# Patient Record
Sex: MALE | Race: WHITE | NOT HISPANIC OR LATINO | Employment: OTHER | URBAN - METROPOLITAN AREA
[De-identification: names, ages, dates, MRNs, and addresses within clinical notes are randomized per-mention and may not be internally consistent; named-entity substitution may affect disease eponyms.]

---

## 2017-01-10 ENCOUNTER — OFFICE VISIT (OUTPATIENT)
Dept: CARDIOLOGY | Facility: CLINIC | Age: 77
End: 2017-01-10

## 2017-01-10 VITALS
DIASTOLIC BLOOD PRESSURE: 65 MMHG | SYSTOLIC BLOOD PRESSURE: 114 MMHG | HEIGHT: 69 IN | BODY MASS INDEX: 29.38 KG/M2 | WEIGHT: 198.4 LBS | HEART RATE: 40 BPM

## 2017-01-10 DIAGNOSIS — I48.3 TYPICAL ATRIAL FLUTTER (HCC): ICD-10-CM

## 2017-01-10 DIAGNOSIS — I48.19 PERSISTENT ATRIAL FIBRILLATION (HCC): ICD-10-CM

## 2017-01-10 DIAGNOSIS — I44.1 SECOND DEGREE AV BLOCK, MOBITZ TYPE I: Primary | ICD-10-CM

## 2017-01-10 PROCEDURE — 99212 OFFICE O/P EST SF 10 MIN: CPT | Performed by: INTERNAL MEDICINE

## 2017-01-10 PROCEDURE — 93000 ELECTROCARDIOGRAM COMPLETE: CPT | Performed by: INTERNAL MEDICINE

## 2017-01-10 RX ORDER — DOFETILIDE 0.5 MG/1
500 CAPSULE ORAL 2 TIMES DAILY
Qty: 180 CAPSULE | Refills: 2 | Status: SHIPPED | OUTPATIENT
Start: 2017-01-10

## 2017-01-10 NOTE — LETTER
January 10, 2017     Sandoval Hunter MD  [address] [facility] [facility]  [address]  [facility] [address]    Patient: Ramos Anders   YOB: 1940   Date of Visit: 1/10/2017       Dear Dr. Dale MD:    Thank you for referring Ramos Anders to me for evaluation. Below are the relevant portions of my assessment and plan of care.    If you have questions, please do not hesitate to call me. I look forward to following Ramos along with you.         Sincerely,        Tenzin Tobar MD        CC: No Recipients  SHEILA José  1/10/2017 10:20 AM  Sign at close encounter  PCP:Dr. Sandoval Hunter      Chief Complaint: afib    History of Present Illness:    The acute uncomplicated chief complaint first occurred in 2012,  is persistent in nature, moderate in severity, random in occurrence, happening few times per every few months, lasting minutes at a time, and has been medicated with Tikosyn, and manifest as palpitations. It is not  worsened with exertion and is improved with rest.    Problem   Persistent Atrial Fibrillation    Persistent afib  A)Tikosyn 2012  B)Echo 2012 normal EF  C)Chadsvasc =2, Patient declined anticoagulation     Second Degree Av Block, Mobitz Type I    Asymptomatic-     Typical Atrial Flutter    S/p ablation 2007-            Current Outpatient Prescriptions:   •  acetaminophen (TYLENOL) 500 MG tablet, Take 2 tablets by mouth daily as needed., Disp: , Rfl:   •  aspirin 81 MG tablet, Take 1 tablet by mouth daily., Disp: , Rfl:   •  atorvastatin (LIPITOR) 20 MG tablet, Take 1 tablet by mouth daily., Disp: 90 tablet, Rfl: 1  •  levothyroxine (SYNTHROID) 125 MCG tablet, Take 1 tablet by mouth daily., Disp: 90 tablet, Rfl: 1  •  Multiple Vitamins-Minerals (MENS MULTI VITAMIN & MINERAL PO), Take 1 tablet by mouth daily., Disp: , Rfl:   •  tadalafil (CIALIS) 5 MG tablet, Take 1 tablet by mouth daily as needed. Take 1 or 2 tablets as needed, Disp: , Rfl:   •  TIKOSYN 500 MCG capsule,  "Take 1 capsule by mouth 2 (Two) Times a Day., Disp: 180 capsule, Rfl: 3   Allergies   Allergen Reactions   • Niacin Itching   • Sildenafil      flushing        ROS:    Denies chest pain, tightness, palpitations, IVY, PND, or edema    Visit Vitals   • /65 (BP Location: Left arm, Patient Position: Sitting)   • Pulse (!) 40   • Ht 69\" (175.3 cm)   • Wt 198 lb 6.4 oz (90 kg)   • BMI 29.3 kg/m2        Physical Exam:    Lungs: CTA, no wheezing, equal air entry bilaterally, resonant to percussion  Cor: RRR, physiologic S1, S2, no rubs, gallops, murmurs or thrills  Ext: warm, negative edema    ECG 12 Lead  Date/Time: 1/10/2017 10:04 AM  Performed by: INDERJIT NGUYEN  Authorized by: INDERJIT NGUYEN   Rhythm: A-V block  Conduction: 2nd degree (Mobitz 1)  ST Segments: ST segments normal  T Waves: T waves normal  Other: no other findings  Clinical impression: abnormal ECG             Diagnosis Plan   1. Second degree AV block, Mobitz type I  Ay symptomatic.  Tikosyn working well and he is tolerating it .  Chadsvasc=2 patient declines anticoagulation. Plan for follow up in 6 months or sooner as needed.   2. Persistent atrial fibrillation     3. Typical atrial flutter              Will Kike VIGIL scribe for Dr. Nguyen  "

## 2017-01-10 NOTE — MR AVS SNAPSHOT
Ramos Anders   1/10/2017 9:45 AM   Office Visit    Dept Phone:  [phone]   Encounter #:  [id number]    Provider:  Tenzin Tobar MD   Department:  [facility] MEDICAL [facility] CARDIOLOGY                Your Full Care Plan              Your Updated Medication List          This list is accurate as of: 1/10/17 10:30 AM.  Always use your most recent med list.                aspirin 81 MG tablet       atorvastatin 20 MG tablet   Commonly known as:  LIPITOR   Take 1 tablet by mouth daily.       levothyroxine 125 MCG tablet   Commonly known as:  SYNTHROID   Take 1 tablet by mouth daily.       MENS MULTI VITAMIN & MINERAL PO       tadalafil 5 MG tablet   Commonly known as:  CIALIS   Take 1 tablet by mouth daily as needed. Take 1 or 2 tablets as needed       TIKOSYN 500 MCG capsule   Generic drug:  dofetilide   Take 1 capsule by mouth 2 (Two) Times a Day.       TYLENOL 500 MG tablet   Generic drug:  acetaminophen               We Performed the Following     ECG 12 Lead       You Were Diagnosed With        Codes Comments    Second degree AV block, Mobitz type I    -  Primary ICD-10-CM: I44.1  ICD-9-CM: 426.13     Persistent atrial fibrillation     ICD-10-CM: I48.1  ICD-9-CM: 427.31     Typical atrial flutter     ICD-10-CM: I48.3  ICD-9-CM: 427.32       Instructions     None    Patient Instructions History      Upcoming Appointments     Visit Type Date Time Department    FOLLOW UP 1/10/2017  9:45 AM [facility]    FOLLOW UP 3/15/2017  8:30 AM [facility]    FOLLOW UP 7/25/2017 10:15 AM [facility]      [facility] Signup     Our records indicate that you have an active [employer] ??? account.    You can view your After Visit Summary by going to [employer] and logging in with your [email or web address] username and password.  If you don't have a [employer] username and password but a parent or guardian has access to your record, the parent or guardian should  "login with their own [employer] username and password and access your record to view the After Visit Summary.    If you have questions, you can email [email or web address] or call [phone] to talk to our [employer] staff.  Remember, [employer] is NOT to be used for urgent needs.  For medical emergencies, dial 911.               Other Info from Your Visit           Your Appointments     Mar 15, 2017  8:30 AM EDT   Follow Up with Sandoval Hunter MD   [facility] INTERNAL MEDICINE (--)    [address]  [facility] [address]   [phone]           Arrive 15 minutes prior to appointment.            Jul 25, 2017 10:15 AM EDT   Follow Up with Tenzin Tobar MD   [facility] CARDIOLOGY (--)    [address]  [facility] [address]   [phone]           Arrive 15 minutes prior to appointment.              Allergies     Niacin Intolerance Itching    Sildenafil Intolerance     flushing      Vital Signs     Blood Pressure Pulse Height Weight Body Mass Index Smoking Status    114/65 (BP Location: Left arm, Patient Position: Sitting) 40 69\" (175.3 cm) 198 lb 6.4 oz (90 kg) 29.3 kg/m2 Never Smoker      Problems and Diagnoses Noted     Atrial fibrillation (irregular heartbeat)    Heart problem    Typical atrial flutter      Results     ECG 12 Lead               "

## 2017-01-10 NOTE — PROGRESS NOTES
"PCP:Dr. Sandoval Hunter      Chief Complaint: afib    History of Present Illness:    The acute uncomplicated chief complaint first occurred in 2012,  is persistent in nature, moderate in severity, random in occurrence, happening few times per every few months, lasting minutes at a time, and has been medicated with Tikosyn, and manifest as palpitations. It is not  worsened with exertion and is improved with rest.    Problem   Persistent Atrial Fibrillation    Persistent afib  A)Tikosyn 2012  B)Echo 2012 normal EF  C)Chadsvasc =2, Patient declined anticoagulation     Second Degree Av Block, Mobitz Type I    Asymptomatic-     Typical Atrial Flutter    S/p ablation 2007-            Current Outpatient Prescriptions:   •  acetaminophen (TYLENOL) 500 MG tablet, Take 2 tablets by mouth daily as needed., Disp: , Rfl:   •  aspirin 81 MG tablet, Take 1 tablet by mouth daily., Disp: , Rfl:   •  atorvastatin (LIPITOR) 20 MG tablet, Take 1 tablet by mouth daily., Disp: 90 tablet, Rfl: 1  •  levothyroxine (SYNTHROID) 125 MCG tablet, Take 1 tablet by mouth daily., Disp: 90 tablet, Rfl: 1  •  Multiple Vitamins-Minerals (MENS MULTI VITAMIN & MINERAL PO), Take 1 tablet by mouth daily., Disp: , Rfl:   •  tadalafil (CIALIS) 5 MG tablet, Take 1 tablet by mouth daily as needed. Take 1 or 2 tablets as needed, Disp: , Rfl:   •  TIKOSYN 500 MCG capsule, Take 1 capsule by mouth 2 (Two) Times a Day., Disp: 180 capsule, Rfl: 3   Allergies   Allergen Reactions   • Niacin Itching   • Sildenafil      flushing        ROS:    Denies chest pain, tightness, palpitations, IVY, PND, or edema    Visit Vitals   • /65 (BP Location: Left arm, Patient Position: Sitting)   • Pulse (!) 40   • Ht 69\" (175.3 cm)   • Wt 198 lb 6.4 oz (90 kg)   • BMI 29.3 kg/m2        Physical Exam:    Lungs: CTA, no wheezing, equal air entry bilaterally, resonant to percussion  Cor: RRR, physiologic S1, S2, no rubs, gallops, murmurs or thrills  Ext: warm, negative " edema    ECG 12 Lead  Date/Time: 1/10/2017 10:04 AM  Performed by: INDERJIT NGUYEN  Authorized by: INDERJIT NGUYEN   Rhythm: A-V block  Conduction: 2nd degree (Mobitz 1)  ST Segments: ST segments normal  T Waves: T waves normal  Other: no other findings  Clinical impression: abnormal ECG             Diagnosis Plan   1. Second degree AV block, Mobitz type I  Ay symptomatic.  Tikosyn working well and he is tolerating it .  Chadsvasc=2 patient declines anticoagulation. Plan for follow up in 6 months or sooner as needed.   2. Persistent atrial fibrillation     3. Typical atrial flutter              Will Kike VIGIL scribe for Dr. Nguyen    I have interviewed/examined patient, reviewed note, exam, labs, have addended, if necessary, and agree.

## 2017-01-13 RX ORDER — ATORVASTATIN CALCIUM 20 MG/1
20 TABLET, FILM COATED ORAL DAILY
Qty: 90 TABLET | Refills: 1 | Status: SHIPPED | OUTPATIENT
Start: 2017-01-13 | End: 2017-07-19 | Stop reason: SDUPTHER

## 2017-01-13 RX ORDER — LEVOTHYROXINE SODIUM 0.12 MG/1
125 TABLET ORAL DAILY
Qty: 90 TABLET | Refills: 1 | Status: SHIPPED | OUTPATIENT
Start: 2017-01-13 | End: 2017-07-19 | Stop reason: SDUPTHER

## 2017-03-15 ENCOUNTER — APPOINTMENT (OUTPATIENT)
Dept: LAB | Facility: HOSPITAL | Age: 77
End: 2017-03-15

## 2017-03-15 ENCOUNTER — OFFICE VISIT (OUTPATIENT)
Dept: INTERNAL MEDICINE | Facility: CLINIC | Age: 77
End: 2017-03-15

## 2017-03-15 VITALS
TEMPERATURE: 96.4 F | RESPIRATION RATE: 16 BRPM | SYSTOLIC BLOOD PRESSURE: 130 MMHG | BODY MASS INDEX: 29.09 KG/M2 | WEIGHT: 197 LBS | DIASTOLIC BLOOD PRESSURE: 84 MMHG | OXYGEN SATURATION: 97 % | HEART RATE: 56 BPM

## 2017-03-15 DIAGNOSIS — E78.00 PURE HYPERCHOLESTEROLEMIA: ICD-10-CM

## 2017-03-15 DIAGNOSIS — E03.4 HYPOTHYROIDISM DUE TO ACQUIRED ATROPHY OF THYROID: ICD-10-CM

## 2017-03-15 DIAGNOSIS — Z96.643 STATUS POST BILATERAL HIP REPLACEMENTS: ICD-10-CM

## 2017-03-15 DIAGNOSIS — I48.0 PAROXYSMAL ATRIAL FIBRILLATION (HCC): Primary | ICD-10-CM

## 2017-03-15 DIAGNOSIS — M15.9 GENERALIZED OSTEOARTHRITIS: ICD-10-CM

## 2017-03-15 LAB
ALBUMIN SERPL-MCNC: 4.4 G/DL (ref 3.2–4.8)
ALBUMIN/GLOB SERPL: 1.8 G/DL (ref 1.5–2.5)
ALP SERPL-CCNC: 62 U/L (ref 25–100)
ALT SERPL W P-5'-P-CCNC: 26 U/L (ref 7–40)
ANION GAP SERPL CALCULATED.3IONS-SCNC: 1 MMOL/L (ref 3–11)
ARTICHOKE IGE QN: 85 MG/DL (ref 0–130)
AST SERPL-CCNC: 25 U/L (ref 0–33)
BILIRUB SERPL-MCNC: 0.8 MG/DL (ref 0.3–1.2)
BUN BLD-MCNC: 23 MG/DL (ref 9–23)
BUN/CREAT SERPL: 20.9 (ref 7–25)
CALCIUM SPEC-SCNC: 9.4 MG/DL (ref 8.7–10.4)
CHLORIDE SERPL-SCNC: 112 MMOL/L (ref 99–109)
CHOLEST SERPL-MCNC: 149 MG/DL (ref 0–200)
CO2 SERPL-SCNC: 30 MMOL/L (ref 20–31)
CREAT BLD-MCNC: 1.1 MG/DL (ref 0.6–1.3)
GFR SERPL CREATININE-BSD FRML MDRD: 65 ML/MIN/1.73
GLOBULIN UR ELPH-MCNC: 2.5 GM/DL
GLUCOSE BLD-MCNC: 95 MG/DL (ref 70–100)
HDLC SERPL-MCNC: 32 MG/DL (ref 40–60)
POTASSIUM BLD-SCNC: 4.9 MMOL/L (ref 3.5–5.5)
PROT SERPL-MCNC: 6.9 G/DL (ref 5.7–8.2)
SODIUM BLD-SCNC: 143 MMOL/L (ref 132–146)
TRIGL SERPL-MCNC: 155 MG/DL (ref 0–150)

## 2017-03-15 PROCEDURE — 80061 LIPID PANEL: CPT | Performed by: INTERNAL MEDICINE

## 2017-03-15 PROCEDURE — 36415 COLL VENOUS BLD VENIPUNCTURE: CPT | Performed by: INTERNAL MEDICINE

## 2017-03-15 PROCEDURE — 80053 COMPREHEN METABOLIC PANEL: CPT | Performed by: INTERNAL MEDICINE

## 2017-03-15 PROCEDURE — 99214 OFFICE O/P EST MOD 30 MIN: CPT | Performed by: INTERNAL MEDICINE

## 2017-03-15 NOTE — PROGRESS NOTES
Subjective   Ramos Anders is a 77 y.o. male.     History of Present Illness     The patient has many years of moderate hyperlipidemia.  He has changed from simvastatin to atorvastatin the last year and tolerates it well.  He follows an [employer] diet.  He has never been known to have atherosclerotic  disease.  His brother did die of coronary disease at age 66.      The following portions of the patient's history were reviewed and updated as appropriate: allergies, current medications, past family history, past medical history, past social history, past surgical history and problem list.    Review of Systems   Constitutional: Negative for appetite change and fatigue.   HENT: Negative for ear pain and sore throat.    Respiratory: Negative for cough and shortness of breath.    Cardiovascular: Negative for chest pain and palpitations.   Gastrointestinal: Negative for abdominal pain and nausea.   Genitourinary: Positive for frequency and urgency.        Urgency and frequency are manageable   Musculoskeletal: Positive for arthralgias. Negative for back pain and gait problem.        Generalized arthralgias are manageable.  Takes ibuprofen to 800 mg on days playing tennis.   Neurological: Negative for dizziness and headaches.       Objective   Blood pressure 130/84, pulse 56, temperature 96.4 °F (35.8 °C), temperature source Oral, resp. rate 16, weight 197 lb (89.4 kg), SpO2 97 %.    Physical Exam   Constitutional: He is oriented to person, place, and time.   Cardiovascular: Normal rate, regular rhythm and normal heart sounds.    Pulmonary/Chest: Effort normal and breath sounds normal. He has no wheezes. He has no rales.   Abdominal: Soft. Bowel sounds are normal. He exhibits no distension. There is no tenderness.   Musculoskeletal: Normal range of motion. He exhibits no edema or tenderness.   Moderate generalized osteoarthritis with minimal tenderness and mild stiffness   Neurological: He is alert  and oriented to person, place, and time. He displays normal reflexes. Coordination normal.   Psychiatric: He has a normal mood and affect. His behavior is normal. Judgment and thought content normal.   Nursing note and vitals reviewed.    Procedures  Assessment/Plan   Ramos was seen today for hyperlipidemia.    Diagnoses and all orders for this visit:    Paroxysmal atrial fibrillation    Pure hypercholesterolemia  -     Comprehensive Metabolic Panel  -     Lipid Panel    Hypothyroidism due to acquired atrophy of thyroid    Generalized osteoarthritis    Status post bilateral hip replacements    The patient's hyperlipidemia is well managed on Lipitor 20 mg with an LDL of 85.  I've asked him to stay on aspirin for primary prevention.  I've asked him to stay on a diabetic diet because of his prediabetes.    The patient has a defibrillation and is monitored by the cardiologist.  He has been advised to start anticoagulation for a chads score 2.  He is not ready to start on anticoagulation because of his vigorous exercise program.  I've counseled him that he may reevaluated later next year.    The patient has moderate generalized osteoarthritis but is done remarkably well with stretching and physical activities.  He uses Tylenol for pain with some success.  He uses ibuprofen once or twice a week while playing tennis because he is much more athletic.    Patient Instructions   1.  Continue same medications and supplements - as listed.    2.  Review yearly - risks and benefits of anticoagulation.    3.  Maintain routine physical fitness program - every week.    4.  Follow a low-calorie diabetic diet - low in salt and low in sugar.    5.  Return in July - annual checkup fasting.    6.  Laboratory tests are acceptable and require no change in treatment    Electronically signed Sandoval Hunter M.D.3/17/2017 5:01 PM

## 2017-03-15 NOTE — PATIENT INSTRUCTIONS
1.  Continue same medications and supplements - as listed.    2.  Review yearly - risks and benefits of anticoagulation.    3.  Maintain routine physical fitness program - every week.    4.  Follow a low-calorie diabetic diet - low in salt and low in sugar.    5.  Return in July - annual checkup fasting.

## 2017-03-23 ENCOUNTER — TELEPHONE (OUTPATIENT)
Dept: INTERNAL MEDICINE | Facility: CLINIC | Age: 77
End: 2017-03-23

## 2017-07-13 ENCOUNTER — LAB (OUTPATIENT)
Dept: LAB | Facility: HOSPITAL | Age: 77
End: 2017-07-13

## 2017-07-13 ENCOUNTER — OFFICE VISIT (OUTPATIENT)
Dept: INTERNAL MEDICINE | Facility: CLINIC | Age: 77
End: 2017-07-13

## 2017-07-13 VITALS
OXYGEN SATURATION: 97 % | TEMPERATURE: 97.7 F | WEIGHT: 190 LBS | HEART RATE: 48 BPM | HEIGHT: 69 IN | BODY MASS INDEX: 28.14 KG/M2 | SYSTOLIC BLOOD PRESSURE: 110 MMHG | RESPIRATION RATE: 16 BRPM | DIASTOLIC BLOOD PRESSURE: 70 MMHG

## 2017-07-13 DIAGNOSIS — N32.81 OVERACTIVE BLADDER: ICD-10-CM

## 2017-07-13 DIAGNOSIS — I48.0 PAROXYSMAL ATRIAL FIBRILLATION (HCC): ICD-10-CM

## 2017-07-13 DIAGNOSIS — R73.02 IMPAIRED GLUCOSE TOLERANCE: ICD-10-CM

## 2017-07-13 DIAGNOSIS — E78.00 PURE HYPERCHOLESTEROLEMIA: ICD-10-CM

## 2017-07-13 DIAGNOSIS — R35.1 NOCTURIA: ICD-10-CM

## 2017-07-13 DIAGNOSIS — I44.1 SECOND DEGREE AV BLOCK, MOBITZ TYPE I: ICD-10-CM

## 2017-07-13 DIAGNOSIS — Z77.018 EXPOSURE TO CHROMIUM: ICD-10-CM

## 2017-07-13 DIAGNOSIS — N52.9 ERECTILE DYSFUNCTION, UNSPECIFIED ERECTILE DYSFUNCTION TYPE: ICD-10-CM

## 2017-07-13 DIAGNOSIS — M15.9 GENERALIZED OSTEOARTHRITIS: ICD-10-CM

## 2017-07-13 DIAGNOSIS — R97.20 ELEVATED PROSTATE SPECIFIC ANTIGEN (PSA): Primary | ICD-10-CM

## 2017-07-13 DIAGNOSIS — D69.6 THROMBOCYTOPENIA (HCC): ICD-10-CM

## 2017-07-13 DIAGNOSIS — E03.4 HYPOTHYROIDISM DUE TO ACQUIRED ATROPHY OF THYROID: ICD-10-CM

## 2017-07-13 LAB
ALBUMIN SERPL-MCNC: 4.6 G/DL (ref 3.2–4.8)
ALBUMIN/GLOB SERPL: 1.9 G/DL (ref 1.5–2.5)
ALP SERPL-CCNC: 72 U/L (ref 25–100)
ALT SERPL W P-5'-P-CCNC: 26 U/L (ref 7–40)
ANION GAP SERPL CALCULATED.3IONS-SCNC: 8 MMOL/L (ref 3–11)
APTT PPP: 27.5 SECONDS (ref 24–31)
ARTICHOKE IGE QN: 85 MG/DL (ref 0–130)
AST SERPL-CCNC: 23 U/L (ref 0–33)
BACTERIA UR QL AUTO: ABNORMAL /HPF
BASOPHILS # BLD AUTO: 0.01 10*3/MM3 (ref 0–0.2)
BASOPHILS NFR BLD AUTO: 0.3 % (ref 0–1)
BILIRUB SERPL-MCNC: 0.8 MG/DL (ref 0.3–1.2)
BILIRUB UR QL STRIP: NEGATIVE
BUN BLD-MCNC: 22 MG/DL (ref 9–23)
BUN/CREAT SERPL: 20 (ref 7–25)
CALCIUM SPEC-SCNC: 9.8 MG/DL (ref 8.7–10.4)
CHLORIDE SERPL-SCNC: 105 MMOL/L (ref 99–109)
CHOLEST SERPL-MCNC: 139 MG/DL (ref 0–200)
CLARITY UR: CLEAR
CO2 SERPL-SCNC: 27 MMOL/L (ref 20–31)
COLOR UR: YELLOW
CREAT BLD-MCNC: 1.1 MG/DL (ref 0.6–1.3)
CRP SERPL-MCNC: 0.17 MG/DL (ref 0–1)
DEPRECATED RDW RBC AUTO: 47.6 FL (ref 37–54)
EOSINOPHIL # BLD AUTO: 0.13 10*3/MM3 (ref 0–0.3)
EOSINOPHIL NFR BLD AUTO: 3.5 % (ref 0–3)
ERYTHROCYTE [DISTWIDTH] IN BLOOD BY AUTOMATED COUNT: 13.9 % (ref 11.3–14.5)
GFR SERPL CREATININE-BSD FRML MDRD: 65 ML/MIN/1.73
GLOBULIN UR ELPH-MCNC: 2.4 GM/DL
GLUCOSE BLD-MCNC: 104 MG/DL (ref 70–100)
GLUCOSE UR STRIP-MCNC: NEGATIVE MG/DL
HBA1C MFR BLD: 5.6 % (ref 4.8–5.6)
HCT VFR BLD AUTO: 41.6 % (ref 38.9–50.9)
HDLC SERPL-MCNC: 32 MG/DL (ref 40–60)
HGB BLD-MCNC: 13.6 G/DL (ref 13.1–17.5)
HGB UR QL STRIP.AUTO: NEGATIVE
HYALINE CASTS UR QL AUTO: ABNORMAL /LPF
IMM GRANULOCYTES # BLD: 0.01 10*3/MM3 (ref 0–0.03)
IMM GRANULOCYTES NFR BLD: 0.3 % (ref 0–0.6)
INR PPP: 1.09
KETONES UR QL STRIP: ABNORMAL
LEUKOCYTE ESTERASE UR QL STRIP.AUTO: ABNORMAL
LYMPHOCYTES # BLD AUTO: 1.21 10*3/MM3 (ref 0.6–4.8)
LYMPHOCYTES NFR BLD AUTO: 32.7 % (ref 24–44)
MCH RBC QN AUTO: 30.8 PG (ref 27–31)
MCHC RBC AUTO-ENTMCNC: 32.7 G/DL (ref 32–36)
MCV RBC AUTO: 94.1 FL (ref 80–99)
MONOCYTES # BLD AUTO: 0.27 10*3/MM3 (ref 0–1)
MONOCYTES NFR BLD AUTO: 7.3 % (ref 0–12)
NEUTROPHILS # BLD AUTO: 2.07 10*3/MM3 (ref 1.5–8.3)
NEUTROPHILS NFR BLD AUTO: 55.9 % (ref 41–71)
NITRITE UR QL STRIP: NEGATIVE
PH UR STRIP.AUTO: 5.5 [PH] (ref 5–8)
PLATELET # BLD AUTO: 101 10*3/MM3 (ref 150–450)
PMV BLD AUTO: 10.2 FL (ref 6–12)
POTASSIUM BLD-SCNC: 4.4 MMOL/L (ref 3.5–5.5)
PROT SERPL-MCNC: 7 G/DL (ref 5.7–8.2)
PROT UR QL STRIP: NEGATIVE
PROTHROMBIN TIME: 11.9 SECONDS (ref 9.6–11.5)
PSA SERPL-MCNC: 5.93 NG/ML (ref 0–4)
RBC # BLD AUTO: 4.42 10*6/MM3 (ref 4.2–5.76)
RBC # UR: ABNORMAL /HPF
REF LAB TEST METHOD: ABNORMAL
SODIUM BLD-SCNC: 140 MMOL/L (ref 132–146)
SP GR UR STRIP: >=1.03 (ref 1–1.03)
SQUAMOUS #/AREA URNS HPF: ABNORMAL /HPF
T4 FREE SERPL-MCNC: 1.3 NG/DL (ref 0.89–1.76)
TRIGL SERPL-MCNC: 126 MG/DL (ref 0–150)
TSH SERPL DL<=0.05 MIU/L-ACNC: 1.71 MIU/ML (ref 0.35–5.35)
UROBILINOGEN UR QL STRIP: ABNORMAL
WBC NRBC COR # BLD: 3.7 10*3/MM3 (ref 3.5–10.8)
WBC UR QL AUTO: ABNORMAL /HPF

## 2017-07-13 PROCEDURE — 83018 HEAVY METAL QUAN EACH NES: CPT | Performed by: INTERNAL MEDICINE

## 2017-07-13 PROCEDURE — 84443 ASSAY THYROID STIM HORMONE: CPT | Performed by: INTERNAL MEDICINE

## 2017-07-13 PROCEDURE — 99215 OFFICE O/P EST HI 40 MIN: CPT | Performed by: INTERNAL MEDICINE

## 2017-07-13 PROCEDURE — 82495 ASSAY OF CHROMIUM: CPT | Performed by: INTERNAL MEDICINE

## 2017-07-13 PROCEDURE — 85730 THROMBOPLASTIN TIME PARTIAL: CPT | Performed by: INTERNAL MEDICINE

## 2017-07-13 PROCEDURE — 80061 LIPID PANEL: CPT | Performed by: INTERNAL MEDICINE

## 2017-07-13 PROCEDURE — 84153 ASSAY OF PSA TOTAL: CPT | Performed by: INTERNAL MEDICINE

## 2017-07-13 PROCEDURE — 84481 FREE ASSAY (FT-3): CPT | Performed by: INTERNAL MEDICINE

## 2017-07-13 PROCEDURE — 93000 ELECTROCARDIOGRAM COMPLETE: CPT | Performed by: INTERNAL MEDICINE

## 2017-07-13 PROCEDURE — 84439 ASSAY OF FREE THYROXINE: CPT | Performed by: INTERNAL MEDICINE

## 2017-07-13 PROCEDURE — 81001 URINALYSIS AUTO W/SCOPE: CPT | Performed by: INTERNAL MEDICINE

## 2017-07-13 PROCEDURE — 85025 COMPLETE CBC W/AUTO DIFF WBC: CPT | Performed by: INTERNAL MEDICINE

## 2017-07-13 PROCEDURE — 80053 COMPREHEN METABOLIC PANEL: CPT | Performed by: INTERNAL MEDICINE

## 2017-07-13 PROCEDURE — 36415 COLL VENOUS BLD VENIPUNCTURE: CPT | Performed by: INTERNAL MEDICINE

## 2017-07-13 PROCEDURE — 85610 PROTHROMBIN TIME: CPT | Performed by: INTERNAL MEDICINE

## 2017-07-13 PROCEDURE — 86140 C-REACTIVE PROTEIN: CPT | Performed by: INTERNAL MEDICINE

## 2017-07-13 PROCEDURE — 83036 HEMOGLOBIN GLYCOSYLATED A1C: CPT | Performed by: INTERNAL MEDICINE

## 2017-07-13 NOTE — PROGRESS NOTES
Subjective   Ramos Anders is a 77 y.o. male.     Chief Complaint   Patient presents with   • Hyperlipidemia       History of Present Illness     The patient has a ten-year history of cardiac arrhythmia.  He presented with atrial flutter in 2007 and underwent ablation therapy.  2013 he had recurrent atrial fibrillation and was again cardioverted.  Over the last 2 years he has entered a chronic atrial fibrillation.  He has been advised to start anticoagulation but feels that he can avoid the embolic risk because of his regular physical exercise.  He does take an aspirin tablet daily.  He still maintains a highly functional lifestyle and plays competitive tennis.  He has had no lightheadedness shortness of breath or exertional chest pains.  He has a family history of coronary disease and continues on Lipitor therapy.  His last LDL cholesterol was 85.  He is prediabetic with mild fasting hyperglycemia.  His hemoglobin A1c's have remained in the normal range.    The following portions of the patient's history were reviewed and updated as appropriate: allergies, current medications, past family history, past medical history, past social history, past surgical history and problem list.    Active Ambulatory Problems     Diagnosis Date Noted   • Second degree AV block, Mobitz type I 06/28/2016   • Typical atrial flutter 06/28/2016   • Ankylosis of joint of thigh 06/30/2016   • Generalized osteoarthritis 06/30/2016   • Hypothyroidism 06/30/2016   • Hyperlipidemia 06/30/2016   • Arthralgia of hip 06/30/2016   • Overactive bladder 06/30/2016   • Impaired glucose tolerance 06/30/2016   • Elevated prostate specific antigen (PSA) 06/30/2016   • Thrombocytopenia 07/07/2016   • Preventative health care 11/09/2016   • Atrial fibrillation    • Diverticulosis    • ED (erectile dysfunction)    • Exposure to chromium 07/13/2017     Resolved Ambulatory Problems     Diagnosis Date Noted   • Persistent atrial fibrillation 06/28/2016    • Congenital hypothyroidism without goiter 2016   • Dyslipidemia 2016   • Osteoarthritis of multiple joints 2016   • Vasculogenic erectile dysfunction 2016   • Diverticulitis of intestine without perforation or abscess 2016   • Status post total right knee replacement 2016   • Status post bilateral hip replacements 2016   • Prediabetes    • Atrial flutter      Past Medical History:   Diagnosis Date   • Abnormal blood level of cobalt    • Actinic keratosis    • Ankylosis    • Atrial flutter    • Chronic atrial fibrillation    • Diverticulosis    • Dupuytren's contracture    • ED (erectile dysfunction)    • Elevated PSA    • Generalized osteoarthritis    • Hazardous metal contact confirmed    • Hemorrhoids    • Hyperlipidemia    • Hypothyroidism    • Osteoporosis    • Overactive bladder    • Pancytopenia    • Prediabetes      Past Surgical History:   Procedure Laterality Date   • CARDIAC ABLATION      Atrial fib   • CARDIOVERSION       atrial fibrillation   • CATARACT EXTRACTION WITH INTRAOCULAR LENS IMPLANT Bilateral 2012    left then right   • TOTAL HIP ARTHROPLASTY Right 2009     and 2015 on left   • TOTAL KNEE ARTHROPLASTY Bilateral      again on left      Family History   Problem Relation Age of Onset   • Alzheimer's disease Mother       age 90   • Cancer Mother      Vaginal   • Hearing loss Mother    • Heart disease Mother      Cardiac surgery   • Pneumonia Father       at age 88   • Kidney failure Father    • Coronary artery disease Brother       age 66   • Diabetes Brother    • Kidney failure Brother    • Heart failure Brother      congestive   • Diabetes Paternal Grandmother    • Heart disease Maternal Uncle    • Osteoarthritis Other      Social History     Social History   • Marital status:      Spouse name: N/A   • Number of children: N/A   • Years of education: N/A     Occupational History    • Not on file.     Social History Main Topics   • Smoking status: Never Smoker   • Smokeless tobacco: Never Used   • Alcohol use 3.0 oz/week     5 Cans of beer per week      Comment: occas.   • Drug use: No   • Sexual activity: Defer     Other Topics Concern   • Not on file     Social History Narrative    Domestic life- lives in private home with wife        Oriental orthodox- Evangelical        Sleep hygiene- in bed 11 PM to 7 AM for  8 hours nightly        Caffeine use- 2 cups coffee daily        Exercise habits- Plays tennis and golf as tolerated, walking daily, occasional stationary cycling        Diet- low calorie diabetic diet - low in salt low in sugar         Occupation- Retired, Computer         Hearing :  No impairment        Vision :  Bifocals        Driving :  No limitations              Review of Systems   Constitutional: Negative for appetite change and fatigue.   HENT: Negative for ear pain and sore throat.    Eyes: Negative for itching and visual disturbance.   Respiratory: Negative for cough and shortness of breath.    Cardiovascular: Negative for chest pain and palpitations.   Gastrointestinal: Negative for abdominal pain and nausea.   Endocrine: Negative for cold intolerance and heat intolerance.   Genitourinary: Negative for dysuria and hematuria.   Musculoskeletal: Positive for arthralgias. Negative for back pain and gait problem.        Joint aches respond to Tylenol daily.  Occasionally takes ibuprofen prior to tennis matches.   Skin: Negative for rash and wound.   Allergic/Immunologic: Negative for environmental allergies and food allergies.   Neurological: Negative for dizziness and headaches.   Hematological: Negative for adenopathy. Does not bruise/bleed easily.   Psychiatric/Behavioral: Negative for sleep disturbance. The patient is not nervous/anxious.        Objective   Blood pressure 110/70, pulse (!) 48, temperature 97.7 °F (36.5 °C), temperature source Oral, resp. rate 16,  "height 69\" (175.3 cm), weight 190 lb (86.2 kg), SpO2 97 %.    Physical Exam   Constitutional: He is oriented to person, place, and time. He appears well-developed and well-nourished. No distress.   HENT:   Right Ear: External ear normal.   Left Ear: External ear normal.   Nose: Nose normal.   Mouth/Throat: Oropharynx is clear and moist.   Eyes: EOM are normal. Pupils are equal, round, and reactive to light. No scleral icterus.   Neck: Normal range of motion. Neck supple. No JVD present. No thyromegaly present.   Cardiovascular: Normal rate, regular rhythm, normal heart sounds and intact distal pulses.    Pulmonary/Chest: Effort normal and breath sounds normal. He has no wheezes. He has no rales.   Abdominal: Soft. Bowel sounds are normal. He exhibits no distension and no mass. There is no tenderness. No hernia.   Genitourinary: Rectum normal and penis normal.   Genitourinary Comments: Testicles normal.  Prostate diffusely enlarged.   Musculoskeletal: He exhibits no edema or tenderness.   Left knee 90° range of motion with moderate stiffness.  Moderate osteoarthritis of hips and knees.   Lymphadenopathy:     He has no cervical adenopathy.   Neurological: He is alert and oriented to person, place, and time. He displays normal reflexes. No cranial nerve deficit. He exhibits normal muscle tone. Coordination normal.   Vibratory normal  Romberg negative  Gait normal  Plantars downgoing     Skin: Skin is warm and dry. No rash noted.   chronic sun damage of face and hands   Psychiatric: He has a normal mood and affect. His behavior is normal. Judgment and thought content normal.   Nursing note and vitals reviewed.      ECG 12 Lead  Date/Time: 7/13/2017 9:26 AM  Performed by: SANDOVAL HUNTER  Authorized by: SANDOVAL HUNTER   Interpreted by ED physician: Sandoval Hunter M.D.  Comparison: compared with previous ECG from 1/10/2017  Similar to previous ECG  Rhythm: atrial fibrillation  Rate: bradycardic  BPM: " 45  Conduction: conduction normal  ST Segments: ST segments normal  T Waves: T waves normal  QRS axis: normal  Clinical impression: abnormal ECG  Comments: Indication atrial fibrillation  Baseline EKG  Possible severe second degree AV block.            Assessment/Plan   Ramos was seen today for hyperlipidemia.    Diagnoses and all orders for this visit:    Elevated prostate specific antigen (PSA)    Exposure to chromium  -     Chromium Level; Future  -     Cobalt; Future    Paroxysmal atrial fibrillation  -     ECG 12 Lead  -     Holter Monitor - 24 Hour  -     Protime-INR  -     APTT; Future    Second degree AV block, Mobitz type I  -     Holter Monitor - 24 Hour    Hypothyroidism due to acquired atrophy of thyroid  -     TSH  -     T4, Free  -     T3, Free    Impaired glucose tolerance  -     Hemoglobin A1c    Overactive bladder  -     Urinalysis With / Microscopic If Indicated  -     Urinalysis, Microscopic Only; Future  -     Urinalysis, Microscopic Only    Generalized osteoarthritis  -     C-reactive Protein  -     PSA    Erectile dysfunction, unspecified erectile dysfunction type    Thrombocytopenia  -     CBC & Differential  -     CBC Auto Differential    Pure hypercholesterolemia  -     Comprehensive Metabolic Panel  -     Lipid Panel    Nocturia   -     PSA      Patient's cardiac arrhythmia has reached a more severe state.  He now has frequent sinus pauses of 2.1 seconds.  His general heart rate is adequate and he has had no significant symptoms.  He should be monitored on a yearly basis.  I've again counseled him on the use of warfarin and other anticoagulants.    His hyperlipidemia is in adequate control with an LDL cholesterol of 85.  I've asked him to continue on aspirin for primary prevention.    The patient's prediabetes is also adequate control with a hemoglobin A1c of 5.6.  This is entered the highest normal range now and he should continue a diabetic diet for goal weight loss of 5 pounds over the  next year.    The patient's had multiple artificial joint replacements.  He runs a high level of cobalt and chromium.  Blood levels this visit are generally stable and require no active treatment.    The patient has an 11 year history of primary hypothyroidism.  He is well compensated with normal thyroid function tests today.    The patient has advanced osteoarthritis with particular changes in his hips and knees requiring joint replacement.  His left knee remains particularly stiff.  I've advised him to use his stationary cycle 3 days weekly to build knee support strength.    Patient Instructions   1.  Wear Holter monitor for 24 hours - return to the office.    2.  Continue usual medicines and supplements - as listed.    3.  Low-calorie diabetic diet - low in salt and low in sugar.  Goal weight - always below 190 pounds.    4.  Stationary cycle 15 minutes - 3 days weekly - build knee strength.    5.  Maintain a routine exercise program - every week.    6.  Consider anticoagulation - for long-term safety.    7.  Return visit November - fasting checkup and wellness exam with repeat PSA.    8.  Confirm Tdap vaccine and Prevnar vaccine next visit.    9.  Blood sugar elevated 104.  Hemoglobin A1c high normal at 5.6%.  Continue diabetic diet.    10.  Thyroid tests are excellent with a TSH of 1.7.    11.  Chromium and cobalt levels are stable.    12.  PSA mildly elevated 5.9%.  Recommend continued monitoring.  Consider antibiotic and repeat.    13.  Urinalysis shows a few extra by blood cells.  Monitor urinary symptoms.     14.  Other laboratory tests are acceptable and require no change in treatment.    15.  Holter monitor shows frequent 2 second pauses.  Generally heart rate is adequate.     Current Outpatient Prescriptions:   •  acetaminophen (TYLENOL) 500 MG tablet, Take 2 tablets by mouth daily as needed., Disp: , Rfl:   •  aspirin 81 MG tablet, Take 1 tablet by mouth daily., Disp: , Rfl:   •  atorvastatin (LIPITOR)  20 MG tablet, Take 1 tablet by mouth Daily., Disp: 90 tablet, Rfl: 1  •  levothyroxine (SYNTHROID) 125 MCG tablet, Take 1 tablet by mouth Daily., Disp: 90 tablet, Rfl: 1  •  Multiple Vitamins-Minerals (MENS MULTI VITAMIN & MINERAL PO), Take 1 tablet by mouth daily., Disp: , Rfl:   •  tadalafil (CIALIS) 5 MG tablet, Take 1 tablet by mouth daily as needed. Take 1 or 2 tablets as needed, Disp: , Rfl:   •  TIKOSYN 500 MCG capsule, Take 1 capsule by mouth 2 (Two) Times a Day., Disp: 180 capsule, Rfl: 2    Allergies   Allergen Reactions   • Niacin Itching   • Sildenafil      flushing       Immunization History   Administered Date(s) Administered   • Influenza, Quadrivalent 01/01/2016   • Pneumococcal Polysaccharide 06/29/2007   • Td 06/02/2006   • Zoster 04/01/2012   • influenza Split 11/09/2016     Electronically signed Sandoval Hunter M.D.7/15/2017 11:26 AM

## 2017-07-13 NOTE — PATIENT INSTRUCTIONS
1.  Wear Holter monitor for 24 hours - return to the office.    2.  Continue usual medicines and supplements - as listed.    3.  Low-calorie diabetic diet - low in salt and low in sugar.  Goal weight - always below 190 pounds.    4.  Stationary cycle 15 minutes - 3 days weekly - build knee strength.    5.  Maintain a routine exercise program - every week.    6.  Consider anticoagulation - for long-term safety.    7.  Return visit November - fasting checkup and wellness exam with repeat PSA.

## 2017-07-14 LAB
COBALT SERPL-MCNC: 14.9 UG/L (ref 0–0.9)
CR SERPL-MCNC: 7.8 UG/L (ref 0.1–2.1)
T3FREE SERPL-MCNC: 2.8 PG/ML (ref 2–4.4)

## 2017-07-15 PROCEDURE — 93224 XTRNL ECG REC UP TO 48 HRS: CPT | Performed by: INTERNAL MEDICINE

## 2017-07-19 ENCOUNTER — TELEPHONE (OUTPATIENT)
Dept: INTERNAL MEDICINE | Facility: CLINIC | Age: 77
End: 2017-07-19

## 2017-07-19 RX ORDER — ATORVASTATIN CALCIUM 20 MG/1
20 TABLET, FILM COATED ORAL DAILY
Qty: 90 TABLET | Refills: 1 | Status: SHIPPED | OUTPATIENT
Start: 2017-07-19 | End: 2018-01-08

## 2017-07-19 RX ORDER — LEVOTHYROXINE SODIUM 0.12 MG/1
125 TABLET ORAL DAILY
Qty: 90 TABLET | Refills: 1 | Status: SHIPPED | OUTPATIENT
Start: 2017-07-19 | End: 2018-01-08

## 2017-09-26 ENCOUNTER — TELEPHONE (OUTPATIENT)
Dept: INTERNAL MEDICINE | Facility: CLINIC | Age: 77
End: 2017-09-26

## 2017-09-26 NOTE — TELEPHONE ENCOUNTER
----- Message from Amara Graham sent at 9/26/2017 11:08 AM EDT -----  Contact: patient  REFERRAL NEEDED: please put a referral in EPIC to a dermatologist for a general skin check.  Prefers not to go to [facility].

## 2017-09-26 NOTE — TELEPHONE ENCOUNTER
Pt notified TGF recommends Dr Clayton cuba/[facility] [place]. His phone # given to pt. Pt will call back if he needs referral.

## 2017-11-09 ENCOUNTER — OFFICE VISIT (OUTPATIENT)
Dept: INTERNAL MEDICINE | Facility: CLINIC | Age: 77
End: 2017-11-09

## 2017-11-09 ENCOUNTER — APPOINTMENT (OUTPATIENT)
Dept: LAB | Facility: HOSPITAL | Age: 77
End: 2017-11-09

## 2017-11-09 DIAGNOSIS — J31.0 CHRONIC RHINITIS, UNSPECIFIED TYPE: Primary | ICD-10-CM

## 2017-11-09 DIAGNOSIS — I48.0 PAF (PAROXYSMAL ATRIAL FIBRILLATION) (HCC): ICD-10-CM

## 2017-11-09 DIAGNOSIS — E78.00 PURE HYPERCHOLESTEROLEMIA: ICD-10-CM

## 2017-11-09 DIAGNOSIS — Z00.00 PREVENTATIVE HEALTH CARE: ICD-10-CM

## 2017-11-09 DIAGNOSIS — M25.551 PAIN OF BOTH HIP JOINTS: ICD-10-CM

## 2017-11-09 DIAGNOSIS — M25.552 PAIN OF BOTH HIP JOINTS: ICD-10-CM

## 2017-11-09 LAB
ALBUMIN SERPL-MCNC: 4.4 G/DL (ref 3.2–4.8)
ALBUMIN/GLOB SERPL: 2.2 G/DL (ref 1.5–2.5)
ALP SERPL-CCNC: 61 U/L (ref 25–100)
ALT SERPL W P-5'-P-CCNC: 21 U/L (ref 7–40)
ANION GAP SERPL CALCULATED.3IONS-SCNC: 1 MMOL/L (ref 3–11)
ARTICHOKE IGE QN: 85 MG/DL (ref 0–130)
AST SERPL-CCNC: 16 U/L (ref 0–33)
BILIRUB SERPL-MCNC: 0.6 MG/DL (ref 0.3–1.2)
BUN BLD-MCNC: 21 MG/DL (ref 9–23)
BUN/CREAT SERPL: 21 (ref 7–25)
CALCIUM SPEC-SCNC: 9 MG/DL (ref 8.7–10.4)
CHLORIDE SERPL-SCNC: 111 MMOL/L (ref 99–109)
CHOLEST SERPL-MCNC: 148 MG/DL (ref 0–200)
CO2 SERPL-SCNC: 29 MMOL/L (ref 20–31)
CREAT BLD-MCNC: 1 MG/DL (ref 0.6–1.3)
GFR SERPL CREATININE-BSD FRML MDRD: 73 ML/MIN/1.73
GLOBULIN UR ELPH-MCNC: 2 GM/DL
GLUCOSE BLD-MCNC: 100 MG/DL (ref 70–100)
HDLC SERPL-MCNC: 29 MG/DL (ref 40–60)
POTASSIUM BLD-SCNC: 4.2 MMOL/L (ref 3.5–5.5)
PROT SERPL-MCNC: 6.4 G/DL (ref 5.7–8.2)
SODIUM BLD-SCNC: 141 MMOL/L (ref 132–146)
TRIGL SERPL-MCNC: 204 MG/DL (ref 0–150)

## 2017-11-09 PROCEDURE — G0439 PPPS, SUBSEQ VISIT: HCPCS | Performed by: INTERNAL MEDICINE

## 2017-11-09 PROCEDURE — 90662 IIV NO PRSV INCREASED AG IM: CPT | Performed by: INTERNAL MEDICINE

## 2017-11-09 PROCEDURE — 36415 COLL VENOUS BLD VENIPUNCTURE: CPT | Performed by: INTERNAL MEDICINE

## 2017-11-09 PROCEDURE — 80053 COMPREHEN METABOLIC PANEL: CPT | Performed by: INTERNAL MEDICINE

## 2017-11-09 PROCEDURE — 80061 LIPID PANEL: CPT | Performed by: INTERNAL MEDICINE

## 2017-11-09 PROCEDURE — G0008 ADMIN INFLUENZA VIRUS VAC: HCPCS | Performed by: INTERNAL MEDICINE

## 2017-11-09 PROCEDURE — 99213 OFFICE O/P EST LOW 20 MIN: CPT | Performed by: INTERNAL MEDICINE

## 2017-11-09 RX ORDER — ECHINACEA PURPUREA EXTRACT 125 MG
1 TABLET ORAL AS NEEDED
COMMUNITY

## 2017-11-09 RX ORDER — FLUTICASONE PROPIONATE 50 MCG
1 SPRAY, SUSPENSION (ML) NASAL DAILY PRN
COMMUNITY

## 2017-11-09 NOTE — PATIENT INSTRUCTIONS
1.  Trial of nasal saline each morning - to moisturize and clean passages.    2.  After nasal saline - use Flonase 1 puff - on each side of nose.    3.  Use this strategy - 3 days to 7 days weekly - to manage excess nasal drainage.    4.  Continue usual medicines and supplements - as listed.    5.  Return visit March - fasting checkup.

## 2017-11-09 NOTE — PROGRESS NOTES
QUICK REFERENCE INFORMATION:  The ABCs of the Annual Wellness Visit    Subsequent Medicare Wellness Visit    HEALTH RISK ASSESSMENT    1940    Recent Hospitalizations:  No hospitalization(s) within the last year..        Current Medical Providers:  Patient Care Team:  Sandoval Hunter MD as PCP - General  Sandoval Hunter MD as PCP - Claims Attributed        Smoking Status:  History   Smoking Status   • Never Smoker   Smokeless Tobacco   • Never Used       Alcohol Consumption:  History   Alcohol Use   • 3.0 oz/week   • 5 Cans of beer per week     Comment: occas.       Depression Screen:   PHQ-2/PHQ-9 Depression Screening 7/15/2017   Little interest or pleasure in doing things 0   Feeling down, depressed, or hopeless 0   Total Score 0       Health Habits and Functional and Cognitive Screening:  No flowsheet data found.        Does the patient have evidence of cognitive impairment? No    Aspirin use counseling: Taking ASA appropriately as indicated      Recent Lab Results:  CMP:  Lab Results   Component Value Date     (H) 12/29/2014    BUN 22 07/13/2017    CREATININE 1.10 07/13/2017    EGFRIFNONA 65 07/13/2017    EGFRIFAFRI 59 (L) 12/29/2014    BCR 20.0 07/13/2017     07/13/2017    K 4.4 07/13/2017    CO2 27.0 07/13/2017    CALCIUM 9.8 07/13/2017    PROTENTOTREF 6.3 12/29/2014    ALBUMIN 4.60 07/13/2017    LABGLOBREF 2.0 12/29/2014    LABIL2 1.9 07/13/2017    BILITOT 0.8 07/13/2017    ALKPHOS 72 07/13/2017    AST 23 07/13/2017    ALT 26 07/13/2017     Lipid Panel:  Lab Results   Component Value Date    CHOL 139 07/13/2017    TRIG 126 07/13/2017    HDL 32 (L) 07/13/2017    VLDL 49 (H) 12/29/2014    LDLDIRECT 85 07/13/2017     HbA1c:  Lab Results   Component Value Date    HGBA1C 5.60 07/13/2017       Visual Acuity:  No exam data present    Age-appropriate Screening Schedule:  Refer to the list below for future screening recommendations based on patient's age, sex and/or medical conditions. Orders  for these recommended tests are listed in the plan section. The patient has been provided with a written plan.    Health Maintenance   Topic Date Due   • TDAP/TD VACCINES (1 - Tdap) 06/03/2006   • PNEUMOCOCCAL VACCINES (65+ LOW/MEDIUM RISK) (2 of 2 - PCV13) 06/29/2008   • DXA SCAN  01/13/2017   • INFLUENZA VACCINE  08/01/2017   • LIPID PANEL  07/13/2018   • COLONOSCOPY  02/13/2021   • ZOSTER VACCINE  Completed        Subjective   History of Present Illness    Ramos Anders is a 77 y.o. male who presents for an Subsequent Wellness Visit.    The following portions of the patient's history were reviewed and updated as appropriate: allergies, current medications, past family history, past medical history, past social history, past surgical history and problem list.    Outpatient Medications Prior to Visit   Medication Sig Dispense Refill   • acetaminophen (TYLENOL) 500 MG tablet Take 2 tablets by mouth daily as needed.     • aspirin 81 MG tablet Take 1 tablet by mouth daily.     • atorvastatin (LIPITOR) 20 MG tablet Take 1 tablet by mouth Daily. 90 tablet 1   • levothyroxine (SYNTHROID) 125 MCG tablet Take 1 tablet by mouth Daily. 90 tablet 1   • Multiple Vitamins-Minerals (MENS MULTI VITAMIN & MINERAL PO) Take 1 tablet by mouth daily.     • tadalafil (CIALIS) 5 MG tablet Take 1 tablet by mouth daily as needed. Take 1 or 2 tablets as needed     • TIKOSYN 500 MCG capsule Take 1 capsule by mouth 2 (Two) Times a Day. 180 capsule 2     No facility-administered medications prior to visit.        Patient Active Problem List   Diagnosis   • Second degree AV block, Mobitz type I   • Typical atrial flutter   • Ankylosis of joint of thigh   • Generalized osteoarthritis   • Hypothyroidism   • Hyperlipidemia   • Arthralgia of hip   • Overactive bladder   • Impaired glucose tolerance   • Elevated prostate specific antigen (PSA)   • Thrombocytopenia   • Preventative health care   • Atrial fibrillation   • Diverticulosis   • ED  (erectile dysfunction)   • Exposure to chromium   • Chronic rhinitis       Advance Care Planning:  has an advance directive - a copy HAS NOT been provided    Identification of Risk Factors:  Risk factors include: cardiovascular risk, increased fall risk, chronic pain and polypharmacy.    Review of Systems    Compared to one year ago, the patient feels his physical health is the same.  Compared to one year ago, the patient feels his mental health is the same.    Objective     Physical Exam    There were no vitals filed for this visit.    There is no height or weight on file to calculate BMI.  Discussed the patient's BMI with him. The BMI is in the acceptable range.    Assessment/Plan   Patient Self-Management and Personalized Health Advice  The patient has been provided with information about: diet, exercise, weight management and fall prevention and preventive services including:   · Exercise counseling provided, Influenza vaccine, Nutrition counseling provided, Pneumococcal vaccine .    Visit Diagnoses:    ICD-10-CM ICD-9-CM   1. Chronic rhinitis, unspecified type J31.0 472.0   2. Pure hypercholesterolemia E78.00 272.0   3. Paroxysmal atrial fibrillation I48.0 427.31   4. Pain of both hip joints M25.551 719.45    M25.552    5. Preventative health care Z00.00 V70.0       Orders Placed This Encounter   Procedures   • Flu Vaccine High Dose PF 65YR+ ([id number])   • Comprehensive Metabolic Panel   • Lipid Panel       Outpatient Encounter Prescriptions as of 11/9/2017   Medication Sig Dispense Refill   • fluticasone (FLONASE) 50 MCG/ACT nasal spray 1 spray into each nostril Every Morning.     • sodium chloride (OCEAN) 0.65 % nasal spray 1 spray into each nostril As Needed.     • acetaminophen (TYLENOL) 500 MG tablet Take 2 tablets by mouth daily as needed.     • aspirin 81 MG tablet Take 1 tablet by mouth daily.     • atorvastatin (LIPITOR) 20 MG tablet Take 1 tablet by mouth Daily. 90 tablet 1   • levothyroxine  (SYNTHROID) 125 MCG tablet Take 1 tablet by mouth Daily. 90 tablet 1   • Multiple Vitamins-Minerals (MENS MULTI VITAMIN & MINERAL PO) Take 1 tablet by mouth daily.     • tadalafil (CIALIS) 5 MG tablet Take 1 tablet by mouth daily as needed. Take 1 or 2 tablets as needed     • TIKOSYN 500 MCG capsule Take 1 capsule by mouth 2 (Two) Times a Day. 180 capsule 2     No facility-administered encounter medications on file as of 11/9/2017.        Reviewed use of high risk medication in the elderly: yes  Reviewed for potential of harmful drug interactions in the elderly: yes    Follow Up:  Return in about 4 months (around 3/9/2018) for Fasting, Recheck.     An After Visit Summary and PPPS with all of these plans were given to the patient.      The wellness exam has been reviewed in detail.  The patient has been fully counseled on preventative guidelines for vaccines, cancer screenings, and other health maintenance needs.  Functional testing has been performed to assess capacity for independent living and need for other medical interventions.   The patient was counseled on maintaining a lifestyle to promote good health and to minimize chronic diseases.  The patient has been assisted with scheduling healthcare procedures for the coming year and given a written document outlining these recommendations.    .Electronically signed Sandoval Hunter M.D.11/9/2017 8:23 AM

## 2017-11-09 NOTE — PROGRESS NOTES
"Subjective   Ramos Anders is a 77 y.o. male.     History of Present Illness     The patient has moderate hyperlipidemia and risk for atherosclerotic cardiovascular disease.  He has been on atorvastatin in recent years and is kept his LDL cholesterol below 100.  He has a defibrillation but has never had a cardiovascular event.    The following portions of the patient's history were reviewed and updated as appropriate: allergies, current medications, past family history, past medical history, past social history, past surgical history and problem list.    Review of Systems   Constitutional: Negative for appetite change and fatigue.   Respiratory: Negative for cough and shortness of breath.    Cardiovascular: Negative for chest pain and palpitations.   Gastrointestinal: Negative for abdominal distention and nausea.   Musculoskeletal: Positive for arthralgias. Negative for back pain and gait problem.   Neurological: Negative for dizziness and light-headedness.       Objective   Blood pressure 140/80, pulse 60, temperature 98 °F (36.7 °C), resp. rate 12, height 69\" (175.3 cm), weight 192 lb (87.1 kg), SpO2 97 %.    Physical Exam   Constitutional: He appears well-developed and well-nourished.   Cardiovascular: Normal rate and regular rhythm.    Pulmonary/Chest: Effort normal and breath sounds normal. He has no wheezes. He has no rales.   Neurological: He is alert. He exhibits normal muscle tone. Coordination normal.   Psychiatric: He has a normal mood and affect. His behavior is normal. Judgment and thought content normal.   Nursing note and vitals reviewed.    Procedures  Assessment/Plan   Ramos was seen today for annual exam and hyperlipidemia.    Diagnoses and all orders for this visit:    Chronic rhinitis, unspecified type    Pure hypercholesterolemia  -     Comprehensive Metabolic Panel  -     Lipid Panel    Paroxysmal atrial fibrillation    Pain of both hip joints    Preventative health care    Other " orders  -     Flu Vaccine High Dose PF 65YR+ ([id number])      The patient's cholesterol management is acceptable with an LDL of 85.  He should continue on aspirin for primary prevention.    The patient has previously documented atrial fibrillation.  Holter monitor in July showed sinus bradycardia with occasional 2 second cause.  He has been counseled on anticoagulation but prefers to take aspirin alone.  He has tolerated Tikosyn well.    The patient is moderate generalized osteoarthritis.  He has had multiple joint replacement surgeries.  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  I've asked him to minimize the use of Advil since he customarily takes 2 tablets prior to playing tennis.    The wellness exam has been reviewed in detail.  The patient has been fully counseled on preventative guidelines for vaccines, cancer screenings, and other health maintenance needs.  Functional testing has been performed to assess capacity for independent living and need for other medical interventions.   The patient was counseled on maintaining a lifestyle to promote good health and to minimize chronic diseases.  The patient has been assisted with scheduling healthcare procedures for the coming year and given a written document outlining these recommendations.    Patient Instructions   1.  Trial of nasal saline each morning - to moisturize and clean passages.    2.  After nasal saline - use Flonase 1 puff - on each side of nose.    3.  Use this strategy - 3 days to 7 days weekly - to manage excess nasal drainage.    4.  Continue usual medicines and supplements - as listed.    5.  Return visit March - fasting checkup.    6.  LDL cholesterol is acceptable at 85.    7.  Chemistry panel is acceptable requires no change in treatment.    Electronically signed Sandoval Hunter M.D.11/11/2017 5:29 PM

## 2017-11-11 VITALS
DIASTOLIC BLOOD PRESSURE: 80 MMHG | BODY MASS INDEX: 28.44 KG/M2 | WEIGHT: 192 LBS | OXYGEN SATURATION: 97 % | HEART RATE: 60 BPM | TEMPERATURE: 98 F | SYSTOLIC BLOOD PRESSURE: 140 MMHG | RESPIRATION RATE: 12 BRPM | HEIGHT: 69 IN

## 2017-11-15 ENCOUNTER — TELEPHONE (OUTPATIENT)
Dept: INTERNAL MEDICINE | Facility: CLINIC | Age: 77
End: 2017-11-15

## 2017-11-15 NOTE — TELEPHONE ENCOUNTER
Called labs to pt.  Per TGF:  -LDL cholesterol is acceptable at 85.  -Chem panel is acceptable requires no change in treatment.  Pt verb understanding.

## 2017-12-04 ENCOUNTER — TELEPHONE (OUTPATIENT)
Dept: INTERNAL MEDICINE | Facility: CLINIC | Age: 77
End: 2017-12-04

## 2017-12-04 NOTE — TELEPHONE ENCOUNTER
----- Message from Aung Mena sent at 12/4/2017 10:04 AM EST -----  Contact: Scott Alberto Complaint:  Has been having lower back irritation for a month or so, now since yesterday it's gotten very bad.  He would like to go for therapy but I told him he would likely have to come in to see TGF.  Scott [phone]

## 2017-12-06 ENCOUNTER — HOSPITAL ENCOUNTER (OUTPATIENT)
Dept: GENERAL RADIOLOGY | Facility: HOSPITAL | Age: 77
Discharge: HOME | End: 2017-12-06
Attending: INTERNAL MEDICINE | Admitting: INTERNAL MEDICINE

## 2017-12-06 ENCOUNTER — OFFICE VISIT (OUTPATIENT)
Dept: INTERNAL MEDICINE | Facility: CLINIC | Age: 77
End: 2017-12-06

## 2017-12-06 VITALS
DIASTOLIC BLOOD PRESSURE: 80 MMHG | BODY MASS INDEX: 28.94 KG/M2 | OXYGEN SATURATION: 96 % | TEMPERATURE: 96.8 F | SYSTOLIC BLOOD PRESSURE: 130 MMHG | HEART RATE: 52 BPM | RESPIRATION RATE: 16 BRPM | WEIGHT: 196 LBS

## 2017-12-06 DIAGNOSIS — M76.01 GLUTEAL TENDINITIS OF BOTH BUTTOCKS: Primary | ICD-10-CM

## 2017-12-06 DIAGNOSIS — M15.9 GENERALIZED OSTEOARTHRITIS: ICD-10-CM

## 2017-12-06 DIAGNOSIS — M46.1 SACROILIITIS (HCC): ICD-10-CM

## 2017-12-06 DIAGNOSIS — M76.02 GLUTEAL TENDINITIS OF BOTH BUTTOCKS: Primary | ICD-10-CM

## 2017-12-06 DIAGNOSIS — M54.50 ACUTE LEFT-SIDED LOW BACK PAIN WITHOUT SCIATICA: ICD-10-CM

## 2017-12-06 PROBLEM — M76.00 GLUTEAL TENDINITIS: Status: ACTIVE | Noted: 2017-12-06

## 2017-12-06 PROCEDURE — 72100 X-RAY EXAM L-S SPINE 2/3 VWS: CPT

## 2017-12-06 PROCEDURE — 99213 OFFICE O/P EST LOW 20 MIN: CPT | Performed by: INTERNAL MEDICINE

## 2017-12-06 NOTE — PROGRESS NOTES
Subjective   Ramos Anders is a 77 y.o. male.     History of Present Illness     The patient's had many years of recurring episodes of low back pain with pain in the buttocks and legs.  Over the last month he has had more of these spells.  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  He was out raking his shard the last several days before the pain became severely intense.    The following portions of the patient's history were reviewed and updated as appropriate: allergies, current medications, past family history, past medical history, past social history, past surgical history and problem list.    Review of Systems   Constitutional: Negative for appetite change and fatigue.   Respiratory: Negative for cough and shortness of breath.    Cardiovascular: Negative for chest pain and palpitations.   Gastrointestinal: Negative for abdominal distention and nausea.   Musculoskeletal: Positive for back pain. Negative for arthralgias and gait problem.   Neurological: Negative for dizziness and light-headedness.       Objective   Blood pressure 130/80, pulse 52, temperature 96.8 °F (36 °C), temperature source Oral, resp. rate 16, weight 88.9 kg (196 lb), SpO2 96 %.    Physical Exam   Constitutional: He is oriented to person, place, and time. He appears well-developed and well-nourished. No distress.   Cardiovascular: Normal rate, regular rhythm and normal heart sounds.    Pulmonary/Chest: Effort normal and breath sounds normal. He has no wheezes. He has no rales.   Musculoskeletal:   Moderate tenderness in posterior pelvic rest and gluteus tendons.  Minimal sacroiliac tenderness.  Hips good range of motion with minimal trochanteric tenderness.   Neurological: He is alert and oriented to person, place, and time. He exhibits normal muscle tone. Coordination normal.   Psychiatric: He has a normal mood and affect. His behavior is normal. Judgment and thought content normal.   Nursing note and vitals  reviewed.    Procedures  Assessment/Plan   Ramos was seen today for back pain.    Diagnoses and all orders for this visit:    Gluteal tendinitis of both buttocks    Sacroiliitis  -     XR Spine Lumbar 2 or 3 View    Generalized osteoarthritis    Acute left-sided low back pain without sciatica  -     XR Spine Lumbar 2 or 3 View    The patient has moderate osteoarthritis of the lumbar spine with minimal disc disease evident he has a good likelihood of improving with physical therapy rehabilitation.  I've cautioned to avoid manual labor which rotates and fatigue since his back.    The patient has some degree of leg aches related to lumbosacral neuralgia.  He may benefit from gabapentin for more severe pain.    Patient Instructions   1.  X-ray of low back - now.    2.  Consider physical therapy - low back rehabilitation.    3.  Avoid excessive standing, bending, and lifting.    4.  Gentle back exercises - progress over the next 3-12 months.    5.  Return visit one month - nonfasting checkup.    6.  X-ray shows advanced arthritis of lumbar spine.    7.  Proceed with physical therapy rehabilitation.    Electronically signed Sandoval Hunter M.D.12/8/2017 4:31 PM

## 2017-12-06 NOTE — PATIENT INSTRUCTIONS
1.  X-ray of low back - now.    2.  Consider physical therapy - low back rehabilitation.    3.  Avoid excessive standing, bending, and lifting.    4.  Gentle back exercises - progress over the next 3-12 months.    5.  Return visit one month - nonfasting checkup.

## 2017-12-07 ENCOUNTER — TELEPHONE (OUTPATIENT)
Dept: INTERNAL MEDICINE | Facility: CLINIC | Age: 77
End: 2017-12-07

## 2017-12-07 RX ORDER — TRAMADOL HYDROCHLORIDE 50 MG/1
50 TABLET ORAL 2 TIMES DAILY PRN
Qty: 30 TABLET | Refills: 0 | OUTPATIENT
Start: 2017-12-07

## 2017-12-07 NOTE — TELEPHONE ENCOUNTER
----- Message from Roxann Bain sent at 12/7/2017  2:10 PM EST -----  Contact: [facility] X-RAY RESULTS- [phone]. PATIENT IS IN A LOT OF PAIN- IS THERE SOMETHING HE CAN TAKE? HE HAS SOME GABAPENTIN LEFT- WILL THAT HELP? [address]

## 2017-12-07 NOTE — TELEPHONE ENCOUNTER
Returned pt's call. He says pain is constant. Per TGF, low back x-ray showed no acute findings. Will order tramadol 50 mg bid prn pain per TGF. Pt verb understanding.

## 2017-12-09 DIAGNOSIS — M54.50 ACUTE LEFT-SIDED LOW BACK PAIN WITHOUT SCIATICA: Primary | ICD-10-CM

## 2017-12-09 DIAGNOSIS — M47.816 LUMBAR SPONDYLOSIS: ICD-10-CM

## 2017-12-09 RX ORDER — GABAPENTIN 100 MG/1
100 CAPSULE ORAL 2 TIMES DAILY
Qty: 120 CAPSULE | Refills: 3 | OUTPATIENT
Start: 2017-12-09

## 2017-12-09 NOTE — PROGRESS NOTES
The patient is having increasing difficulty with his low back pain & left hip pain radiating down to the left knee.  He can walk 20 steps well, but then his leg starts to feel weak.  He stops walking to prevent limping.  He is having more pain overnight.  I've counseled him on gabapentin and asked him to start this medication and report to us in 2 days.  He should continue tramadol as needed.  He should do gentle stretching exercises and will start physical therapy on December 18.  He may need to proceed with MRI of the back.  He may require a cortisone shot if not improved by next Friday.

## 2017-12-11 ENCOUNTER — TELEPHONE (OUTPATIENT)
Dept: INTERNAL MEDICINE | Facility: CLINIC | Age: 77
End: 2017-12-11

## 2017-12-11 NOTE — TELEPHONE ENCOUNTER
----- Message from Roxann Bain sent at 12/11/2017 10:01 AM EST -----  Contact: [phone]  TOLD TO CALL PER DEONTE ON [facility]- THE GABAPENTIN IS REDUCING SOME OF THE AGGRAVATION AND PAIN-- WOULD LIKE TO GET THAT MRI DONE

## 2017-12-14 ENCOUNTER — OFFICE VISIT (OUTPATIENT)
Dept: INTERNAL MEDICINE | Facility: CLINIC | Age: 77
End: 2017-12-14

## 2017-12-14 VITALS
BODY MASS INDEX: 29.18 KG/M2 | WEIGHT: 197 LBS | SYSTOLIC BLOOD PRESSURE: 114 MMHG | HEIGHT: 69 IN | TEMPERATURE: 97.7 F | OXYGEN SATURATION: 97 % | HEART RATE: 60 BPM | DIASTOLIC BLOOD PRESSURE: 80 MMHG | RESPIRATION RATE: 16 BRPM

## 2017-12-14 DIAGNOSIS — M76.02 GLUTEAL TENDINITIS OF BOTH BUTTOCKS: ICD-10-CM

## 2017-12-14 DIAGNOSIS — M47.816 LUMBAR SPONDYLOSIS: Primary | ICD-10-CM

## 2017-12-14 DIAGNOSIS — M46.1 SACROILIITIS (HCC): ICD-10-CM

## 2017-12-14 DIAGNOSIS — M76.01 GLUTEAL TENDINITIS OF BOTH BUTTOCKS: ICD-10-CM

## 2017-12-14 DIAGNOSIS — N40.0 PROSTATISM: ICD-10-CM

## 2017-12-14 PROCEDURE — 99213 OFFICE O/P EST LOW 20 MIN: CPT | Performed by: INTERNAL MEDICINE

## 2017-12-14 PROCEDURE — 20610 DRAIN/INJ JOINT/BURSA W/O US: CPT | Performed by: INTERNAL MEDICINE

## 2017-12-14 RX ORDER — METHYLPREDNISOLONE ACETATE 80 MG/ML
80 INJECTION, SUSPENSION INTRA-ARTICULAR; INTRALESIONAL; INTRAMUSCULAR; SOFT TISSUE ONCE
Status: COMPLETED | OUTPATIENT
Start: 2017-12-14 | End: 2017-12-14

## 2017-12-14 RX ORDER — TAMSULOSIN HYDROCHLORIDE 0.4 MG/1
1 CAPSULE ORAL NIGHTLY
Qty: 90 CAPSULE | Refills: 3 | Status: SHIPPED | OUTPATIENT
Start: 2017-12-14 | End: 2018-02-16 | Stop reason: SDUPTHER

## 2017-12-14 RX ADMIN — METHYLPREDNISOLONE ACETATE 80 MG: 80 INJECTION, SUSPENSION INTRA-ARTICULAR; INTRALESIONAL; INTRAMUSCULAR; SOFT TISSUE at 11:05

## 2017-12-14 NOTE — PROGRESS NOTES
"Subjective   Ramos Anders is a 77 y.o. male.     History of Present Illness     The patient's had a marked increase in low back pain with left gluteal tenderness and leg pain in recent weeks.  He has failed conservative treatment with rest, heat, and gabapentin.  He is also developed a markedly weak urine stream.     The following portions of the patient's history were reviewed and updated as appropriate: allergies, current medications, past family history, past medical history, past social history, past surgical history and problem list.    Review of Systems   Constitutional: Negative for appetite change and fatigue.   Gastrointestinal: Negative for abdominal distention and nausea.   Genitourinary: Positive for frequency and urgency.   Musculoskeletal: Positive for back pain and gait problem.   Neurological: Negative for dizziness and light-headedness.       Objective   Blood pressure 114/80, pulse 60, temperature 97.7 °F (36.5 °C), temperature source Oral, resp. rate 16, height 175.3 cm (69\"), weight 89.4 kg (197 lb), SpO2 97 %.    Physical Exam   Constitutional: He is oriented to person, place, and time. He appears well-developed and well-nourished. No distress.   Musculoskeletal: He exhibits no edema.   Moderate left gluteal tenderness and sacroiliac tenderness.  Straight leg raises 45° bilaterally with moderate back stiffness.   Neurological: He is alert and oriented to person, place, and time. He exhibits normal muscle tone. Coordination normal.   Psychiatric: He has a normal mood and affect. His behavior is normal. Judgment and thought content normal.   Nursing note and vitals reviewed.    Arthrocentesis  Date/Time: 12/14/2017 11:02 AM  Performed by: RODRICK CLEMONS  Authorized by: RODRICK CLEMONS   Consent: Verbal consent obtained. Written consent obtained.  Risks and benefits: risks, benefits and alternatives were discussed  Consent given by: patient  Patient understanding: patient states " "understanding of the procedure being performed  Patient consent: the patient's understanding of the procedure matches consent given  Procedure consent: procedure consent matches procedure scheduled  Relevant documents: relevant documents present and verified  Test results: test results available and properly labeled  Site marked: the operative site was marked  Imaging studies: imaging studies available  Patient identity confirmed: verbally with patient  Time out: Immediately prior to procedure a \"time out\" was called to verify the correct patient, procedure, equipment, support staff and site/side marked as required.  Indications: pain   Body area: hip  Joint: left hip  Local anesthesia used: no    Anesthesia:  Local anesthesia used: no    Sedation:  Patient sedated: no  Needle size: 20 G  Ultrasound guidance: no  Approach: posterior  Methylprednisolone amount: 80 mg  Bupivacaine 0.50% amount: 2 mL  Patient tolerance: Patient tolerated the procedure well with no immediate complications  Comments: The left posterior buttocks and sacroiliac region was sterilized with Betadine and isopropyl alcohol.  Using sterile technique Depo-Medrol and Marcaine were injected in the lower sacral leg joint and the upper gluteal attachment.  The patient experienced no pain.  The patient was able to get down and dress himself.        Assessment/Plan   Ramos was seen today for back pain.    Diagnoses and all orders for this visit:    Lumbar spondylosis    Gluteal tendinitis of both buttocks  -     Arthrocentesis  -     methylPREDNISolone acetate (DEPO-medrol) injection 80 mg; Inject 1 mL into the joint 1 (One) Time.    Sacroiliitis  -     Arthrocentesis  -     methylPREDNISolone acetate (DEPO-medrol) injection 80 mg; Inject 1 mL into the joint 1 (One) Time.    Prostatism    Other orders  -     tamsulosin (FLOMAX) 0.4 MG capsule 24 hr capsule; Take 1 capsule by mouth Every Night.    The patient's had a marked increase in weak urine " stream.  Although this may be related to his lumbar disease, and alcohol marks of presentation of prostatism.    The patient tolerated his injection therapy well.  I've encouraged him to be cautious for the remainder of the month and gradually developed his physical therapy rehabilitation.    Patient Instructions   1.  Start Flomax at bedtime - to improve urine stream.    2.  Follow-through with physical therapy - for back rehabilitation.    3.  Avoid excessive bending and lifting - protect her back.    4.  Call the office in one week - with status report.    5.  Return visit January 8 - fasting checkup.    Electronically signed Sandoval Hunter M.D.12/16/2017 11:54 AM

## 2017-12-14 NOTE — PATIENT INSTRUCTIONS
1.  Start Flomax at bedtime - to improve urine stream.    2.  Follow-through with physical therapy - for back rehabilitation.    3.  Avoid excessive bending and lifting - protect her back.    4.  Call the office in one week - with status report.    5.  Return visit January 8 - fasting checkup.

## 2018-01-08 ENCOUNTER — OFFICE VISIT (OUTPATIENT)
Dept: INTERNAL MEDICINE | Facility: CLINIC | Age: 78
End: 2018-01-08

## 2018-01-08 VITALS
HEART RATE: 60 BPM | WEIGHT: 195 LBS | TEMPERATURE: 97.4 F | OXYGEN SATURATION: 97 % | DIASTOLIC BLOOD PRESSURE: 60 MMHG | SYSTOLIC BLOOD PRESSURE: 100 MMHG | RESPIRATION RATE: 16 BRPM | BODY MASS INDEX: 28.8 KG/M2

## 2018-01-08 DIAGNOSIS — M47.816 LUMBAR SPONDYLOSIS: ICD-10-CM

## 2018-01-08 DIAGNOSIS — M76.02 GLUTEAL TENDINITIS OF BOTH BUTTOCKS: Primary | ICD-10-CM

## 2018-01-08 DIAGNOSIS — M15.9 GENERALIZED OSTEOARTHRITIS: ICD-10-CM

## 2018-01-08 DIAGNOSIS — M24.659: ICD-10-CM

## 2018-01-08 DIAGNOSIS — M76.01 GLUTEAL TENDINITIS OF BOTH BUTTOCKS: Primary | ICD-10-CM

## 2018-01-08 DIAGNOSIS — N40.0 PROSTATISM: ICD-10-CM

## 2018-01-08 PROCEDURE — 99214 OFFICE O/P EST MOD 30 MIN: CPT | Performed by: INTERNAL MEDICINE

## 2018-01-08 RX ORDER — ATORVASTATIN CALCIUM 20 MG/1
20 TABLET, FILM COATED ORAL DAILY
Qty: 90 TABLET | Refills: 3 | Status: SHIPPED | OUTPATIENT
Start: 2018-01-08 | End: 2018-01-12 | Stop reason: SDUPTHER

## 2018-01-08 RX ORDER — LEVOTHYROXINE SODIUM 0.12 MG/1
125 TABLET ORAL DAILY
Qty: 90 TABLET | Refills: 3 | Status: SHIPPED | OUTPATIENT
Start: 2018-01-08

## 2018-01-08 NOTE — PROGRESS NOTES
Subjective   Ramos Anders is a 77 y.o. male.     History of Present Illness     The patient presented 1 month ago with progressive back pain.  Through November his back became increasingly stiff and uncomfortable.  He had to stop many activities of daily living including his exercise program.  He was often confined to home because of excessive pain and stiffness with ambulation.  He was started on gabapentin and tramadol with minimal success.  On December 14 required cortisone injections and is been working with a physical therapist since that time.  X-rays show moderate lumbar spondylosis.    The following portions of the patient's history were reviewed and updated as appropriate: allergies, current medications, past family history, past medical history, past social history, past surgical history and problem list.    Review of Systems   Constitutional: Negative for appetite change and fatigue.   HENT: Negative for ear pain and sore throat.    Respiratory: Negative for cough and shortness of breath.    Cardiovascular: Negative for chest pain and palpitations.   Gastrointestinal: Negative for abdominal pain and nausea.   Genitourinary: Positive for frequency and urgency.        Urine stream improved on Flomax   Musculoskeletal: Positive for arthralgias, back pain and gait problem.   Neurological: Negative for dizziness and headaches.       Objective   Blood pressure 100/60, pulse 60, temperature 97.4 °F (36.3 °C), temperature source Oral, resp. rate 16, weight 88.5 kg (195 lb), SpO2 97 %.    Physical Exam   Constitutional: He is oriented to person, place, and time. He appears well-developed and well-nourished. No distress.   Neck: Normal range of motion. Neck supple. No JVD present.   Cardiovascular: Normal rate, regular rhythm and normal heart sounds.    Pulmonary/Chest: Effort normal and breath sounds normal. He has no wheezes. He has no rales.   Musculoskeletal: He exhibits no edema.   Moderate tenderness in  low back at iliac crest and upper gluteal attachments.  There is no sacral iliac tenderness.  Hips and knees have moderate stiffness with straight leg raises at 45°.   Lymphadenopathy:     He has no cervical adenopathy.   Neurological: He is alert and oriented to person, place, and time. He exhibits normal muscle tone. Coordination normal.   Psychiatric: He has a normal mood and affect. His behavior is normal. Judgment and thought content normal.   Nursing note and vitals reviewed.    Procedures  Assessment/Plan   Ramos was seen today for back pain.    Diagnoses and all orders for this visit:    Gluteal tendinitis of both buttocks    Lumbar spondylosis    Ankylosis of joint of thigh, unspecified laterality    Generalized osteoarthritis    Prostatism    Other orders  -     levothyroxine (SYNTHROID) 125 MCG tablet; Take 1 tablet by mouth Daily.  -     atorvastatin (LIPITOR) 20 MG tablet; Take 1 tablet by mouth Daily.    The patient has extensive lumbar spondylosis with many years of relapsing back pain.  He has had a marked flare over last month and is on aggressive treatment with medications and physical therapy.    The patient has generalized osteoarthritis with stiffness through his hips and knees.  Tylenol plus tramadol are his only safe medications in view of his heart condition and his risk for renal and GI distress.    The patient's had moderate prostatism increasing this year.  He has done well in recent weeks on tamsulosin.    Patient Instructions   1.  Only rarely uses ibuprofen - if necessary - for acute arthritic pain.    2.  Use gabapentin and Tylenol as needed - for pain management..    3.  Continue back rehabilitation exercises - on a permanent basis.    4.  Limit high impact activity - to protect joints.    5.  Return in 3 months - fasting checkup.    Electronically signed Snadoval Hunter M.D.1/9/2018 7:04 PM

## 2018-01-08 NOTE — PATIENT INSTRUCTIONS
1.  Only rarely uses ibuprofen - if necessary - for acute arthritic pain.    2.  Use gabapentin and Tylenol as needed - for pain management..    3.  Continue back rehabilitation exercises - on a permanent basis.    4.  Limit high impact activity - to protect joints.    5.  Return in 3 months - fasting checkup.

## 2018-01-12 ENCOUNTER — TELEPHONE (OUTPATIENT)
Dept: INTERNAL MEDICINE | Facility: CLINIC | Age: 78
End: 2018-01-12

## 2018-01-12 RX ORDER — ATORVASTATIN CALCIUM 20 MG/1
20 TABLET, FILM COATED ORAL DAILY
Qty: 30 TABLET | Refills: 0 | Status: SHIPPED | OUTPATIENT
Start: 2018-01-12 | End: 2018-05-22 | Stop reason: SDUPTHER

## 2018-01-12 NOTE — TELEPHONE ENCOUNTER
Tenzin from [facility] on [facility] called the office normally alee gets this medication from another pharmacy but her needs it today and they will refill it     Atorvastatin  Refill again from [facility]

## 2018-01-30 ENCOUNTER — TELEPHONE (OUTPATIENT)
Dept: INTERNAL MEDICINE | Facility: CLINIC | Age: 78
End: 2018-01-30

## 2018-01-30 RX ORDER — AMOXICILLIN 500 MG/1
CAPSULE ORAL
Qty: 20 CAPSULE | Refills: 0 | Status: SHIPPED | OUTPATIENT
Start: 2018-01-30

## 2018-01-30 NOTE — TELEPHONE ENCOUNTER
PATIENT IS CALLING AND GOING TO THE DENTIST HE CALLED AND SAID THE NURSE HAS TO CALL SOMETHING AND CANT FOR THE LIFE OF HIM REMEMBER WHAT IT IS HE NEEDS TO TAKE DUE TO THE ARTIFICAL KNEES AND HIP-    STATES THE NURSE CALLS THIS IN BEFORE HAND     DOES THIS MAKE SENSE?    PLEASE CALL IT INTO HIS PHARMACY

## 2018-02-16 ENCOUNTER — TELEPHONE (OUTPATIENT)
Dept: INTERNAL MEDICINE | Facility: CLINIC | Age: 78
End: 2018-02-16

## 2018-02-16 RX ORDER — FINASTERIDE 5 MG/1
5 TABLET, FILM COATED ORAL DAILY
Qty: 90 TABLET | Refills: 3 | Status: SHIPPED | OUTPATIENT
Start: 2018-02-16

## 2018-02-16 RX ORDER — TAMSULOSIN HYDROCHLORIDE 0.4 MG/1
1 CAPSULE ORAL EVERY OTHER DAY
Qty: 90 CAPSULE | Refills: 3
Start: 2018-02-16

## 2018-02-16 NOTE — TELEPHONE ENCOUNTER
Pt stopped flomax 3 weeks ago d/t feeling lethargic tired and irritable on it. It helped his urine stream but since he's been off of it he's developed frequency (1-2x/night) and weak stream again. Asking if there's an alternative.  Per TGF, start finasteride 5 mg qd.   Advised finasteride can take a few months to kick in so to resume flomax just QOD for next couple months.   F/u at 3/7 appt.  Pt verb understanding and agreeable.

## 2018-03-07 ENCOUNTER — OFFICE VISIT (OUTPATIENT)
Dept: INTERNAL MEDICINE | Facility: CLINIC | Age: 78
End: 2018-03-07

## 2018-03-07 ENCOUNTER — APPOINTMENT (OUTPATIENT)
Dept: LAB | Facility: HOSPITAL | Age: 78
End: 2018-03-07

## 2018-03-07 VITALS
HEIGHT: 69 IN | DIASTOLIC BLOOD PRESSURE: 68 MMHG | TEMPERATURE: 97.1 F | WEIGHT: 197 LBS | OXYGEN SATURATION: 96 % | BODY MASS INDEX: 29.18 KG/M2 | SYSTOLIC BLOOD PRESSURE: 112 MMHG | RESPIRATION RATE: 16 BRPM | HEART RATE: 78 BPM

## 2018-03-07 DIAGNOSIS — M15.9 GENERALIZED OSTEOARTHRITIS: ICD-10-CM

## 2018-03-07 DIAGNOSIS — R73.02 IMPAIRED GLUCOSE TOLERANCE: ICD-10-CM

## 2018-03-07 DIAGNOSIS — E78.00 PURE HYPERCHOLESTEROLEMIA: Primary | ICD-10-CM

## 2018-03-07 DIAGNOSIS — N40.0 PROSTATISM: ICD-10-CM

## 2018-03-07 LAB
ALBUMIN SERPL-MCNC: 4.4 G/DL (ref 3.2–4.8)
ALBUMIN/GLOB SERPL: 1.9 G/DL (ref 1.5–2.5)
ALP SERPL-CCNC: 74 U/L (ref 25–100)
ALT SERPL W P-5'-P-CCNC: 29 U/L (ref 7–40)
ANION GAP SERPL CALCULATED.3IONS-SCNC: 3 MMOL/L (ref 3–11)
ARTICHOKE IGE QN: 88 MG/DL (ref 0–130)
AST SERPL-CCNC: 27 U/L (ref 0–33)
BILIRUB SERPL-MCNC: 0.9 MG/DL (ref 0.3–1.2)
BUN BLD-MCNC: 19 MG/DL (ref 9–23)
BUN/CREAT SERPL: 17.3 (ref 7–25)
CALCIUM SPEC-SCNC: 9.2 MG/DL (ref 8.7–10.4)
CHLORIDE SERPL-SCNC: 110 MMOL/L (ref 99–109)
CHOLEST SERPL-MCNC: 141 MG/DL (ref 0–200)
CO2 SERPL-SCNC: 28 MMOL/L (ref 20–31)
CREAT BLD-MCNC: 1.1 MG/DL (ref 0.6–1.3)
GFR SERPL CREATININE-BSD FRML MDRD: 65 ML/MIN/1.73
GLOBULIN UR ELPH-MCNC: 2.3 GM/DL
GLUCOSE BLD-MCNC: 114 MG/DL (ref 70–100)
HDLC SERPL-MCNC: 30 MG/DL (ref 40–60)
POTASSIUM BLD-SCNC: 4.4 MMOL/L (ref 3.5–5.5)
PROT SERPL-MCNC: 6.7 G/DL (ref 5.7–8.2)
SODIUM BLD-SCNC: 141 MMOL/L (ref 132–146)
TRIGL SERPL-MCNC: 167 MG/DL (ref 0–150)

## 2018-03-07 PROCEDURE — 36415 COLL VENOUS BLD VENIPUNCTURE: CPT | Performed by: INTERNAL MEDICINE

## 2018-03-07 PROCEDURE — G0439 PPPS, SUBSEQ VISIT: HCPCS | Performed by: INTERNAL MEDICINE

## 2018-03-07 PROCEDURE — 99214 OFFICE O/P EST MOD 30 MIN: CPT | Performed by: INTERNAL MEDICINE

## 2018-03-07 PROCEDURE — 80053 COMPREHEN METABOLIC PANEL: CPT | Performed by: INTERNAL MEDICINE

## 2018-03-07 PROCEDURE — 80061 LIPID PANEL: CPT | Performed by: INTERNAL MEDICINE

## 2018-03-07 NOTE — PATIENT INSTRUCTIONS
1.  Continue usual medicines and supplements - as listed.    2.  Consider greenlight TURP this summer - for refractory prostatism.    3.  Follow well-balanced diabetic diet - low in salt and low in sugar.    4.  Maintain routine physical fitness program - every week.    5.  Return visit 4 months - annual checkup fasting.

## 2018-03-07 NOTE — PROGRESS NOTES
QUICK REFERENCE INFORMATION:  The ABCs of the Annual Wellness Visit    Subsequent Medicare Wellness Visit    HEALTH RISK ASSESSMENT    1940    Recent Hospitalizations:  No hospitalization(s) within the last year..        Current Medical Providers:  Patient Care Team:  Sandoval Hunter MD as PCP - General  Sandoval Hunter MD as PCP - Claims Attributed        Smoking Status:  History   Smoking Status   • Never Smoker   Smokeless Tobacco   • Never Used       Alcohol Consumption:  History   Alcohol Use   • 3.0 oz/week   • 5 Cans of beer per week     Comment: occas.       Depression Screen:   PHQ-2/PHQ-9 Depression Screening 3/7/2018   Little interest or pleasure in doing things 0   Feeling down, depressed, or hopeless 0   Total Score 0       Health Habits and Functional and Cognitive Screening:  Functional & Cognitive Status 3/7/2018   Do you have difficulty preparing food and eating? No   Do you have difficulty bathing yourself, getting dressed or grooming yourself? No   Do you have difficulty using the toilet? No   Do you have difficulty moving around from place to place? No   Do you have trouble with steps or getting out of a bed or a chair? No   In the past year have you fallen or experienced a near fall? No   Current Diet Well Balanced Diet   Dental Exam Up to date   Eye Exam Up to date   Exercise (times per week) 4 times per week   Current Exercise Activities Include Cardiovasular Workout on Exercise Equipment   Do you need help using the phone?  No   Are you deaf or do you have serious difficulty hearing?  No   Do you need help with transportation? No   Do you need help shopping? No   Do you need help preparing meals?  No   Do you need help with housework?  No   Do you need help with laundry? No   Do you need help taking your medications? No   Do you need help managing money? No   Have you felt unusual stress, anger or loneliness in the last month? No   Who do you live with? Spouse   If you need help,  do you have trouble finding someone available to you? No   Have you been bothered in the last four weeks by sexual problems? No   Do you have difficulty concentrating, remembering or making decisions? No           Does the patient have evidence of cognitive impairment? No    Aspirin use counseling: Taking ASA appropriately as indicated      Recent Lab Results:  CMP:  Lab Results   Component Value Date     (H) 12/29/2014    BUN 21 11/09/2017    CREATININE 1.00 11/09/2017    EGFRIFNONA 73 11/09/2017    EGFRIFAFRI 59 (L) 12/29/2014    BCR 21.0 11/09/2017     11/09/2017    K 4.2 11/09/2017    CO2 29.0 11/09/2017    CALCIUM 9.0 11/09/2017    PROTENTOTREF 6.3 12/29/2014    ALBUMIN 4.40 11/09/2017    LABGLOBREF 2.0 12/29/2014    LABIL2 2.2 11/09/2017    BILITOT 0.6 11/09/2017    ALKPHOS 61 11/09/2017    AST 16 11/09/2017    ALT 21 11/09/2017     Lipid Panel:  Lab Results   Component Value Date    CHOL 148 11/09/2017    TRIG 204 (H) 11/09/2017    HDL 29 (L) 11/09/2017    VLDL 49 (H) 12/29/2014     HbA1c:  Lab Results   Component Value Date    HGBA1C 5.60 07/13/2017       Visual Acuity:  No exam data present    Age-appropriate Screening Schedule:  Refer to the list below for future screening recommendations based on patient's age, sex and/or medical conditions. Orders for these recommended tests are listed in the plan section. The patient has been provided with a written plan.    Health Maintenance   Topic Date Due   • TDAP/TD VACCINES (1 - Tdap) 06/03/2006   • PNEUMOCOCCAL VACCINES (65+ LOW/MEDIUM RISK) (2 of 2 - PCV13) 06/29/2008   • DXA SCAN  01/13/2017   • LIPID PANEL  11/09/2018   • COLONOSCOPY  02/13/2021   • INFLUENZA VACCINE  Completed   • ZOSTER VACCINE  Completed        Subjective   History of Present Illness    Ramos Anders is a 78 y.o. male who presents for an Subsequent Wellness Visit.    The following portions of the patient's history were reviewed and updated as appropriate: allergies,  current medications, past family history, past medical history, past social history, past surgical history and problem list.    Outpatient Medications Prior to Visit   Medication Sig Dispense Refill   • acetaminophen (TYLENOL) 500 MG tablet Take 2 tablets by mouth daily as needed.     • amoxicillin (AMOXIL) 500 MG capsule Take 4 capsules 1 hour prior to dental appointment 20 capsule 0   • aspirin 81 MG tablet Take 1 tablet by mouth daily.     • atorvastatin (LIPITOR) 20 MG tablet Take 1 tablet by mouth Daily. 30 tablet 0   • finasteride (PROSCAR) 5 MG tablet Take 1 tablet by mouth Daily. 90 tablet 3   • fluticasone (FLONASE) 50 MCG/ACT nasal spray 1 spray into each nostril Every Morning.     • gabapentin (NEURONTIN) 100 MG capsule Take 1 capsule by mouth 2 (Two) Times a Day. + 2 capsules at bedtime 120 capsule 3   • levothyroxine (SYNTHROID) 125 MCG tablet Take 1 tablet by mouth Daily. 90 tablet 3   • Multiple Vitamins-Minerals (MENS MULTI VITAMIN & MINERAL PO) Take 1 tablet by mouth daily.     • sodium chloride (OCEAN) 0.65 % nasal spray 1 spray into each nostril As Needed.     • tadalafil (CIALIS) 5 MG tablet Take 1 tablet by mouth daily as needed. Take 1 or 2 tablets as needed     • tamsulosin (FLOMAX) 0.4 MG capsule 24 hr capsule Take 1 capsule by mouth Every Other Day. 90 capsule 3   • TIKOSYN 500 MCG capsule Take 1 capsule by mouth 2 (Two) Times a Day. 180 capsule 2   • traMADol (ULTRAM) 50 MG tablet Take 1 tablet by mouth 2 (Two) Times a Day As Needed for Moderate Pain . 30 tablet 0     No facility-administered medications prior to visit.        Patient Active Problem List   Diagnosis   • Second degree AV block, Mobitz type I   • Typical atrial flutter   • Ankylosis of joint of thigh   • Generalized osteoarthritis   • Hypothyroidism   • Hyperlipidemia   • Arthralgia of hip   • Overactive bladder   • Impaired glucose tolerance   • Elevated prostate specific antigen (PSA)   • Thrombocytopenia   • Preventative  "health care   • PAF (paroxysmal atrial fibrillation)   • Diverticulosis   • ED (erectile dysfunction)   • Exposure to chromium   • Chronic rhinitis   • Low back pain   • Sacroiliitis   • Gluteal tendinitis   • Lumbar spondylosis   • Prostatism       Advance Care Planning:  has an advance directive - a copy has been provided and is in file    Identification of Risk Factors:  Risk factors include: weight , unhealthy diet, cardiovascular risk and polypharmacy.    Review of Systems    Compared to one year ago, the patient feels his physical health is the same.  Compared to one year ago, the patient feels his mental health is the same.    Objective     Physical Exam    Vitals:    03/07/18 0819   BP: 112/68   BP Location: Right arm   Patient Position: Sitting   Cuff Size: Adult   Pulse: 78   Resp: 16   Temp: 97.1 °F (36.2 °C)   TempSrc: Oral   SpO2: 96%   Weight: 89.4 kg (197 lb)   Height: 175.3 cm (69\")   PainSc: 0-No pain       Body mass index is 29.09 kg/(m^2).  Discussed the patient's BMI with him. BMI is above normal parameters. Follow-up plan includes:  exercise counseling and nutrition counseling.    Assessment/Plan   Patient Self-Management and Personalized Health Advice  The patient has been provided with information about: diet, exercise, weight management, prevention of cardiac or vascular disease and fall prevention and preventive services including:   · Exercise counseling provided, Fall Risk assessment done, Nutrition counseling provided.    Visit Diagnoses:  No diagnosis found.    No orders of the defined types were placed in this encounter.      Outpatient Encounter Prescriptions as of 3/7/2018   Medication Sig Dispense Refill   • acetaminophen (TYLENOL) 500 MG tablet Take 2 tablets by mouth daily as needed.     • amoxicillin (AMOXIL) 500 MG capsule Take 4 capsules 1 hour prior to dental appointment 20 capsule 0   • aspirin 81 MG tablet Take 1 tablet by mouth daily.     • atorvastatin (LIPITOR) 20 MG " tablet Take 1 tablet by mouth Daily. 30 tablet 0   • finasteride (PROSCAR) 5 MG tablet Take 1 tablet by mouth Daily. 90 tablet 3   • fluticasone (FLONASE) 50 MCG/ACT nasal spray 1 spray into each nostril Every Morning.     • gabapentin (NEURONTIN) 100 MG capsule Take 1 capsule by mouth 2 (Two) Times a Day. + 2 capsules at bedtime 120 capsule 3   • levothyroxine (SYNTHROID) 125 MCG tablet Take 1 tablet by mouth Daily. 90 tablet 3   • Multiple Vitamins-Minerals (MENS MULTI VITAMIN & MINERAL PO) Take 1 tablet by mouth daily.     • sodium chloride (OCEAN) 0.65 % nasal spray 1 spray into each nostril As Needed.     • tadalafil (CIALIS) 5 MG tablet Take 1 tablet by mouth daily as needed. Take 1 or 2 tablets as needed     • tamsulosin (FLOMAX) 0.4 MG capsule 24 hr capsule Take 1 capsule by mouth Every Other Day. 90 capsule 3   • TIKOSYN 500 MCG capsule Take 1 capsule by mouth 2 (Two) Times a Day. 180 capsule 2   • traMADol (ULTRAM) 50 MG tablet Take 1 tablet by mouth 2 (Two) Times a Day As Needed for Moderate Pain . 30 tablet 0     No facility-administered encounter medications on file as of 3/7/2018.        Reviewed use of high risk medication in the elderly: yes  Reviewed for potential of harmful drug interactions in the elderly: yes    Follow Up:  No Follow-up on file.     An After Visit Summary and PPPS with all of these plans were given to the patient.        The wellness exam has been reviewed in detail.  The patient has been fully counseled on preventative guidelines for vaccines, cancer screenings, and other health maintenance needs.  Functional testing has been performed to assess capacity for independent living and need for other medical interventions.   The patient was counseled on maintaining a lifestyle to promote good health and to minimize chronic diseases.  The patient has been assisted with scheduling healthcare procedures for the coming year and given a written document outlining these  recommendations.    Electronically signed Sandoval Hunter M.D.3/9/2018 6:50 AM

## 2018-03-09 ENCOUNTER — TELEPHONE (OUTPATIENT)
Dept: INTERNAL MEDICINE | Facility: CLINIC | Age: 78
End: 2018-03-09

## 2018-03-09 NOTE — TELEPHONE ENCOUNTER
I called labs to pt. Per TGF:  -FBG mildly elevated 114.  Continue diabetic diet.  -Cholesterol level and chemistry panel are acceptable.  -Restrict calories and bring weight below 190 pounds.  Pt verb understanding.

## 2018-03-09 NOTE — PROGRESS NOTES
"Subjective   Ramos Anders is a 78 y.o. male.     History of Present Illness     The patient's had recent years of prostatism.  Last year he tried Flomax but developed intolerance.  He has been on Flomax every 48 hours and tolerates it well but he still getting up 3 or 4 times at night.  He has a weak urine stream and does have overactive bladder during the day.  He has had no burning on urination, hematuria,.  He does have erectile dysfunction and occasionally uses Cialis with success.    The following portions of the patient's history were reviewed and updated as appropriate: allergies, current medications, past family history, past medical history, past social history, past surgical history and problem list.    Review of Systems   Constitutional: Negative for appetite change and fatigue.   HENT: Negative for ear pain and sore throat.    Eyes:        Recent floaters and dry cause intermittent blurred vision   Respiratory: Negative for cough and shortness of breath.    Cardiovascular: Negative for chest pain and palpitations.   Gastrointestinal: Negative for abdominal pain and nausea.   Genitourinary: Positive for frequency and urgency.        Continues with polyuria and nocturia   Musculoskeletal: Negative for arthralgias and back pain.        Low back pain has resolved.   Neurological: Negative for dizziness and headaches.       Objective   Blood pressure 112/68, pulse 78, temperature 97.1 °F (36.2 °C), temperature source Oral, resp. rate 16, height 175.3 cm (69\"), weight 89.4 kg (197 lb), SpO2 96 %.    Physical Exam   Constitutional: He is oriented to person, place, and time. He appears well-developed and well-nourished. No distress.   Eyes: Conjunctivae and EOM are normal. Pupils are equal, round, and reactive to light.   Neck: Normal range of motion. Neck supple. No JVD present.   Cardiovascular: Normal rate, regular rhythm and normal heart sounds.    Pulmonary/Chest: Effort normal and breath sounds " normal. He has no wheezes. He has no rales.   Musculoskeletal:   No paralumbar tightness tenderness.  Hips and knees have moderate stiffness and minimal tenderness   Lymphadenopathy:     He has no cervical adenopathy.   Neurological: He is alert and oriented to person, place, and time. He exhibits normal muscle tone. Coordination normal.   Psychiatric: He has a normal mood and affect. His behavior is normal. Judgment and thought content normal.   Nursing note and vitals reviewed.    Procedures  Assessment/Plan   Ramos was seen today for annual exam and benign prostatic hypertrophy.    Diagnoses and all orders for this visit:    Pure hypercholesterolemia  -     Comprehensive Metabolic Panel  -     Lipid Panel    Impaired glucose tolerance    Generalized osteoarthritis    Prostatism      The patient has progressive prostatism and has failed Flomax.  He has started finasteride in just recent weeks.  He has a mildly elevated PSA for many years.  I've counseled him on TURP and recommended he pursue urology consultation.    Patient's had several years of fasting hyperglycemia.  He should follow a diabetic diet and work towards a goal rate below 190 pounds.    The patient has hyperlipidemia and is done well with atorvastatin 20 MG.  His current LDL cholesterol of 88 is acceptable.  He should continue aspirin for primary prevention.    The wellness exam has been reviewed in detail.  The patient has been fully counseled on preventative guidelines for vaccines, cancer screenings, and other health maintenance needs.  Functional testing has been performed to assess capacity for independent living and need for other medical interventions.   The patient was counseled on maintaining a lifestyle to promote good health and to minimize chronic diseases.  The patient has been assisted with scheduling healthcare procedures for the coming year and given a written document outlining these recommendations.    Patient Instructions   1.   Continue usual medicines and supplements - as listed.    2.  Consider greenlight TURP this summer - for refractory prostatism.    3.  Follow well-balanced diabetic diet - low in salt and low in sugar.    4.  Maintain routine physical fitness program - every week.    5.  Return visit 4 months - annual checkup fasting.    6.  Fasting blood sugar mildly elevated 114.  Continue diabetic diet.    7.  Cholesterol level and chemistry panel are acceptable.    8.  Restrict calories and bring weight below 190 pounds.    Electronically signed Sandoval Hunter M.D.3/9/2018 7:08 AM

## 2018-04-05 ENCOUNTER — APPOINTMENT (OUTPATIENT)
Dept: CT IMAGING | Facility: HOSPITAL | Age: 78
End: 2018-04-05

## 2018-04-05 ENCOUNTER — APPOINTMENT (OUTPATIENT)
Dept: GENERAL RADIOLOGY | Facility: HOSPITAL | Age: 78
End: 2018-04-05

## 2018-04-05 ENCOUNTER — APPOINTMENT (OUTPATIENT)
Dept: MRI IMAGING | Facility: HOSPITAL | Age: 78
End: 2018-04-05
Attending: EMERGENCY MEDICINE

## 2018-04-05 ENCOUNTER — HOSPITAL ENCOUNTER (EMERGENCY)
Facility: HOSPITAL | Age: 78
Discharge: HOME | End: 2018-04-05
Attending: EMERGENCY MEDICINE | Admitting: EMERGENCY MEDICINE

## 2018-04-05 ENCOUNTER — TELEPHONE (OUTPATIENT)
Dept: INTERNAL MEDICINE | Facility: CLINIC | Age: 78
End: 2018-04-05

## 2018-04-05 VITALS
DIASTOLIC BLOOD PRESSURE: 60 MMHG | BODY MASS INDEX: 28.88 KG/M2 | OXYGEN SATURATION: 97 % | HEART RATE: 50 BPM | WEIGHT: 195 LBS | TEMPERATURE: 97.7 F | SYSTOLIC BLOOD PRESSURE: 124 MMHG | RESPIRATION RATE: 16 BRPM | HEIGHT: 69 IN

## 2018-04-05 DIAGNOSIS — H81.391 PERIPHERAL VERTIGO INVOLVING RIGHT EAR: Primary | ICD-10-CM

## 2018-04-05 LAB
ALBUMIN SERPL-MCNC: 4.5 G/DL (ref 3.2–4.8)
ALBUMIN/GLOB SERPL: 1.9 G/DL (ref 1.5–2.5)
ALP SERPL-CCNC: 76 U/L (ref 25–100)
ALT SERPL W P-5'-P-CCNC: 27 U/L (ref 7–40)
ANION GAP SERPL CALCULATED.3IONS-SCNC: 11 MMOL/L (ref 3–11)
AST SERPL-CCNC: 18 U/L (ref 0–33)
BASOPHILS # BLD AUTO: 0.01 10*3/MM3 (ref 0–0.2)
BASOPHILS NFR BLD AUTO: 0.3 % (ref 0–1)
BILIRUB SERPL-MCNC: 0.9 MG/DL (ref 0.3–1.2)
BILIRUB UR QL STRIP: NEGATIVE
BUN BLD-MCNC: 21 MG/DL (ref 9–23)
BUN BLDA-MCNC: 22 MG/DL (ref 8–26)
BUN/CREAT SERPL: 17.5 (ref 7–25)
CA-I BLDA-SCNC: 1.25 MMOL/L (ref 1.2–1.32)
CALCIUM SPEC-SCNC: 9.4 MG/DL (ref 8.7–10.4)
CHLORIDE BLDA-SCNC: 105 MMOL/L (ref 98–109)
CHLORIDE SERPL-SCNC: 101 MMOL/L (ref 99–109)
CLARITY UR: CLEAR
CO2 BLDA-SCNC: 25 MMOL/L (ref 24–29)
CO2 SERPL-SCNC: 25 MMOL/L (ref 20–31)
COLOR UR: YELLOW
CREAT BLD-MCNC: 1.2 MG/DL (ref 0.6–1.3)
CREAT BLDA-MCNC: 1.2 MG/DL (ref 0.6–1.3)
DEPRECATED RDW RBC AUTO: 45 FL (ref 37–54)
EOSINOPHIL # BLD AUTO: 0.04 10*3/MM3 (ref 0–0.3)
EOSINOPHIL NFR BLD AUTO: 1.1 % (ref 0–3)
ERYTHROCYTE [DISTWIDTH] IN BLOOD BY AUTOMATED COUNT: 13.5 % (ref 11.3–14.5)
GFR SERPL CREATININE-BSD FRML MDRD: 59 ML/MIN/1.73
GLOBULIN UR ELPH-MCNC: 2.4 GM/DL
GLUCOSE BLD-MCNC: 126 MG/DL (ref 70–100)
GLUCOSE BLDC GLUCOMTR-MCNC: 128 MG/DL (ref 70–130)
GLUCOSE UR STRIP-MCNC: NEGATIVE MG/DL
HCT VFR BLD AUTO: 39.6 % (ref 38.9–50.9)
HCT VFR BLDA CALC: 37 % (ref 38–51)
HGB BLD-MCNC: 13.4 G/DL (ref 13.1–17.5)
HGB BLDA-MCNC: 12.6 G/DL (ref 12–17)
HGB UR QL STRIP.AUTO: NEGATIVE
IMM GRANULOCYTES # BLD: 0.01 10*3/MM3 (ref 0–0.03)
IMM GRANULOCYTES NFR BLD: 0.3 % (ref 0–0.6)
INR PPP: 1.1 (ref 0.8–1.2)
KETONES UR QL STRIP: NEGATIVE
LEUKOCYTE ESTERASE UR QL STRIP.AUTO: NEGATIVE
LYMPHOCYTES # BLD AUTO: 0.86 10*3/MM3 (ref 0.6–4.8)
LYMPHOCYTES NFR BLD AUTO: 23.8 % (ref 24–44)
MCH RBC QN AUTO: 31 PG (ref 27–31)
MCHC RBC AUTO-ENTMCNC: 33.8 G/DL (ref 32–36)
MCV RBC AUTO: 91.7 FL (ref 80–99)
MONOCYTES # BLD AUTO: 0.19 10*3/MM3 (ref 0–1)
MONOCYTES NFR BLD AUTO: 5.2 % (ref 0–12)
NEUTROPHILS # BLD AUTO: 2.51 10*3/MM3 (ref 1.5–8.3)
NEUTROPHILS NFR BLD AUTO: 69.3 % (ref 41–71)
NITRITE UR QL STRIP: NEGATIVE
PH UR STRIP.AUTO: 7 [PH] (ref 5–8)
PLATELET # BLD AUTO: 85 10*3/MM3 (ref 150–450)
PMV BLD AUTO: 10.4 FL (ref 6–12)
POTASSIUM BLD-SCNC: 4.1 MMOL/L (ref 3.5–5.5)
POTASSIUM BLDA-SCNC: 4.2 MMOL/L (ref 3.5–4.9)
PROT SERPL-MCNC: 6.9 G/DL (ref 5.7–8.2)
PROT UR QL STRIP: NEGATIVE
PROTHROMBIN TIME: 13.5 SECONDS (ref 12.8–15.2)
RBC # BLD AUTO: 4.32 10*6/MM3 (ref 4.2–5.76)
SODIUM BLD-SCNC: 137 MMOL/L (ref 132–146)
SODIUM BLDA-SCNC: 141 MMOL/L (ref 138–146)
SP GR UR STRIP: 1.01 (ref 1–1.03)
TROPONIN I SERPL-MCNC: 0 NG/ML (ref 0–0.07)
TROPONIN I SERPL-MCNC: 0 NG/ML (ref 0–0.07)
UROBILINOGEN UR QL STRIP: NORMAL
WBC NRBC COR # BLD: 3.62 10*3/MM3 (ref 3.5–10.8)

## 2018-04-05 PROCEDURE — 70551 MRI BRAIN STEM W/O DYE: CPT

## 2018-04-05 PROCEDURE — 85610 PROTHROMBIN TIME: CPT

## 2018-04-05 PROCEDURE — 80047 BASIC METABLC PNL IONIZED CA: CPT

## 2018-04-05 PROCEDURE — 80053 COMPREHEN METABOLIC PANEL: CPT | Performed by: EMERGENCY MEDICINE

## 2018-04-05 PROCEDURE — 0 IOPAMIDOL PER 1 ML: Performed by: EMERGENCY MEDICINE

## 2018-04-05 PROCEDURE — 85025 COMPLETE CBC W/AUTO DIFF WBC: CPT | Performed by: EMERGENCY MEDICINE

## 2018-04-05 PROCEDURE — 93005 ELECTROCARDIOGRAM TRACING: CPT | Performed by: EMERGENCY MEDICINE

## 2018-04-05 PROCEDURE — 0042T HC CT CEREBRAL PERFUSION W/WO CONTRAST: CPT

## 2018-04-05 PROCEDURE — 81003 URINALYSIS AUTO W/O SCOPE: CPT | Performed by: EMERGENCY MEDICINE

## 2018-04-05 PROCEDURE — 84484 ASSAY OF TROPONIN QUANT: CPT

## 2018-04-05 PROCEDURE — 85014 HEMATOCRIT: CPT

## 2018-04-05 PROCEDURE — 70450 CT HEAD/BRAIN W/O DYE: CPT

## 2018-04-05 PROCEDURE — 99284 EMERGENCY DEPT VISIT MOD MDM: CPT

## 2018-04-05 PROCEDURE — 71045 X-RAY EXAM CHEST 1 VIEW: CPT

## 2018-04-05 RX ORDER — MECLIZINE HYDROCHLORIDE 25 MG/1
25 TABLET ORAL EVERY 6 HOURS PRN
Qty: 20 TABLET | Refills: 0 | Status: SHIPPED | OUTPATIENT
Start: 2018-04-05 | End: 2018-04-10

## 2018-04-05 RX ADMIN — IOPAMIDOL 40 ML: 755 INJECTION, SOLUTION INTRAVENOUS at 12:45

## 2018-04-05 NOTE — TELEPHONE ENCOUNTER
Med Complaint:  Scott is having issues with unbalance.  Feels like he's falling and losing his balance.  I will put him down for 4:30 today but he wants to talk to a nurse and see if he really needs to come in.

## 2018-04-05 NOTE — ED PROVIDER NOTES
Subjective   This morning he woke up at 0830 to dizziness and feeling off balance and unsteady while ambulating. His off balance persisted for 2 hours and thought he was falling to his right side. He had to hold onto the wall or objects while ambulating to prevent himself from falling. Moving his head left to right did not cause his symptoms to worsen but moving head up and down worsens dizziness. His dizziness and off balance improved upon sitting down. He called his PCP Dr. Hunter and spoke to his nurse who referred him here to the ED. He denies any weakness in his extremities, chest pain, abdominal pain, facial weakness, dysarthria, hearing loss, diarrhea, nausea, bloody stools, fever, cough or any other complaints at this time. He has chronic tinnitis. He sees Dr. Tobar, Cardiology every 6 months for his cardiac history of A-fib and is on Tikosyn BID.                History provided by:  Patient  Neurologic Problem   The patient's primary symptoms include a loss of balance. The patient's pertinent negatives include no altered mental status, focal weakness, near-syncope, slurred speech, syncope, visual change or weakness. This is a new problem. The current episode started today. The neurological problem developed suddenly. The problem is unchanged. There was no focality noted. Associated symptoms include dizziness. Pertinent negatives include no abdominal pain, bladder incontinence, bowel incontinence, chest pain, fever, nausea, shortness of breath or vomiting. Past treatments include nothing. The treatment provided no relief. (A-fib)       Review of Systems   Constitutional: Negative for fever.   HENT: Positive for tinnitus (chronic). Negative for hearing loss and voice change.    Respiratory: Negative for cough and shortness of breath.    Cardiovascular: Negative for chest pain and near-syncope.   Gastrointestinal: Negative for abdominal pain, blood in stool, bowel incontinence, diarrhea, nausea and  vomiting.   Genitourinary: Negative for bladder incontinence and dysuria.   Neurological: Positive for dizziness and loss of balance. Negative for focal weakness, syncope, facial asymmetry, speech difficulty and weakness.   All other systems reviewed and are negative.      Past Medical History:   Diagnosis Date   • Actinic keratosis     Annual skin treatment   • Allergic rhinitis Lifelong   • Atrial flutter     ablation therapy   • Chronic atrial fibrillation     Cardioversion - temporary success    • Diverticulosis    • Dry eye syndrome 2017   • Dupuytren's contracture    • ED (erectile dysfunction)    • Elevated PSA    • Generalized osteoarthritis Adulthood    Joint replacement surgeries   • Hazardous metal contact confirmed      high levels - cobalt and chromium - joint replacement surgery   • Hemorrhoids    • Hyperlipidemia    • Hypothyroidism    • Lumbar spondylosis     Recurrent episodes low back pain   • Overactive bladder    • Prediabetes 2013    Fasting hyperglycemia   • Prostatism 2017    Side effects on daily Flomax   • Thrombocytopenia 2005    Mild persistent with borderline leukopenia       Allergies   Allergen Reactions   • Tamsulosin Other (See Comments)     Malaise   • Niacin Itching   • Sildenafil      flushing       Past Surgical History:   Procedure Laterality Date   • CARDIAC ABLATION      Atrial fib   • CARDIOVERSION  2013     atrial fibrillation   • CATARACT EXTRACTION WITH INTRAOCULAR LENS IMPLANT Bilateral , 2014    left then right   • COLONOSCOPY  2016    Diverticulosis only   • TOTAL HIP ARTHROPLASTY Right     [facility]   • TOTAL HIP ARTHROPLASTY Left 10/2015    Dr. Lucas at [facility]   • TOTAL KNEE ARTHROPLASTY Bilateral        Family History   Problem Relation Age of Onset   • Alzheimer's disease Mother       age 90   • Cancer Mother      Vaginal   • Hearing loss Mother    • Heart disease Mother      Cardiac surgery   •  Pneumonia Father       at age 88   • Kidney failure Father    • Coronary artery disease Brother       age 66   • Diabetes Brother    • Kidney failure Brother    • Heart failure Brother      congestive   • Diabetes Paternal Grandmother    • Heart disease Maternal Uncle    • Osteoarthritis Other        Social History     Social History   • Marital status:      Social History Main Topics   • Smoking status: Never Smoker   • Smokeless tobacco: Never Used   • Alcohol use 3.0 oz/week     5 Cans of beer per week      Comment: occas.   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 He appears well-developed and well-nourished. No distress.   HENT:   Head: Normocephalic and atraumatic.   Right Ear: External ear normal.   Left Ear: External ear normal.   Nose: Nose normal.   Horizontal nystagmus. When he gazes to left has beat to the right that is clockwise in motion.   Eyes: Conjunctivae are normal. No scleral icterus.   Neck: Normal range of motion. Neck supple.   Cardiovascular: Normal rate, regular rhythm and normal heart sounds.  Exam reveals no friction rub.    No murmur heard.  Heart is regular with normal rhythm.    Pulmonary/Chest: Effort normal and breath sounds normal. No respiratory distress. He has no wheezes. He has no rales.   Abdominal: Soft.  Bowel sounds are normal. He exhibits no distension. There is no tenderness.   Musculoskeletal: Normal range of motion. He exhibits no edema or tenderness.   Neurological: He is alert and oriented to person, place, and time.   No facial weakness or asymmetry.   No focal weakness.   He is able to stand with ataxia.   No dysarthria or aphagia.    Skin: Skin is warm and dry. Capillary refill takes less than 2 seconds. He is not diaphoretic.   Psychiatric: He has a normal mood and affect. His behavior is normal.   Nursing note and vitals reviewed.      Procedures         ED Course  ED Course   Comment By Time   Dr. Franks updated patient with results. Currently waiting radiologist read of MRI.  Jai CHO Camilo 04/05 1358                     [facility]  Number of Diagnoses or Management Options  Peripheral vertigo involving right ear: new and requires workup  Diagnosis management comments: Patient had a CT scan of the head without contrast, CT perfusion scan, an MRI the brain that did not show any acute abnormalities.    He shouldn't has nystagmus with fast which towards the right with left lateral gaze.,  Patient's symptoms are consistent with peripheral vertigo.    I discussed the patient with the on-call stroke neurologist, Dr. Torres, who agrees with outpatient primary care physician follow-up, and meclizine to be taken as needed.    Patient's EKG shows Mobitz type I block, this is unchanged compared to previous baseline in 2013.    I discussed the patient with his primary care physician, Dr. Hunter, who agrees and will follow the patient on outpatient basis.     Discharged home appearing well.       Amount and/or Complexity of Data Reviewed  Clinical lab tests: ordered and reviewed  Tests in the radiology section of CPT®: ordered and reviewed  Obtain history from someone other than the patient: yes  Review and summarize past medical records: yes  Discuss the patient with other providers: yes  Independent visualization of  images, tracings, or specimens: yes    Patient Progress  Patient progress: stable      Final diagnoses:   Peripheral vertigo involving right ear       Documentation assistance provided by nataly Page.  Information recorded by the scribe was done at my direction and has been verified and validated by me.     Jai Page  04/05/18 1310       Jai Page  04/05/18 1446       Jai Page  04/05/18 1531       Marian Franks MD  04/05/18 [id number]

## 2018-04-05 NOTE — TELEPHONE ENCOUNTER
"I called pt. He said when got up this am he could hardly maintain his balance. He says he tends to drift to the right side. He's had to use the wall to lean on when he walks for stablilty. He denies dizziness or lightheadedness. When he moves his head up and down he feels \"loss of control\". /74 P50s. He said he's never experienced this before.  Per TGF, pt to go to [facility] now for evaluation. Pt verb understanding and compliance.  "

## 2018-05-22 ENCOUNTER — TELEPHONE (OUTPATIENT)
Dept: INTERNAL MEDICINE | Facility: CLINIC | Age: 78
End: 2018-05-22

## 2018-05-22 RX ORDER — ATORVASTATIN CALCIUM 20 MG/1
20 TABLET, FILM COATED ORAL DAILY
Qty: 90 TABLET | Refills: 1 | Status: SHIPPED | OUTPATIENT
Start: 2018-05-22

## 2018-05-22 NOTE — TELEPHONE ENCOUNTER
Pt is calling needing a refill of his atorvastatin please call this to [facility] on [address]

## 2018-07-12 ENCOUNTER — APPOINTMENT (OUTPATIENT)
Dept: LAB | Facility: HOSPITAL | Age: 78
End: 2018-07-12

## 2018-07-12 ENCOUNTER — OFFICE VISIT (OUTPATIENT)
Dept: INTERNAL MEDICINE | Facility: CLINIC | Age: 78
End: 2018-07-12

## 2018-07-12 VITALS
WEIGHT: 188.8 LBS | RESPIRATION RATE: 16 BRPM | DIASTOLIC BLOOD PRESSURE: 74 MMHG | OXYGEN SATURATION: 98 % | TEMPERATURE: 96.6 F | SYSTOLIC BLOOD PRESSURE: 128 MMHG | HEART RATE: 64 BPM | BODY MASS INDEX: 27.88 KG/M2

## 2018-07-12 DIAGNOSIS — D69.6 THROMBOCYTOPENIA (HCC): ICD-10-CM

## 2018-07-12 DIAGNOSIS — E78.00 PURE HYPERCHOLESTEROLEMIA: Primary | ICD-10-CM

## 2018-07-12 DIAGNOSIS — R73.02 IMPAIRED GLUCOSE TOLERANCE: ICD-10-CM

## 2018-07-12 DIAGNOSIS — M15.9 GENERALIZED OSTEOARTHRITIS: ICD-10-CM

## 2018-07-12 LAB
ALBUMIN SERPL-MCNC: 4.79 G/DL (ref 3.2–4.8)
ALBUMIN/GLOB SERPL: 1.9 G/DL (ref 1.5–2.5)
ALP SERPL-CCNC: 66 U/L (ref 25–100)
ALT SERPL W P-5'-P-CCNC: 31 U/L (ref 7–40)
ANION GAP SERPL CALCULATED.3IONS-SCNC: 7 MMOL/L (ref 3–11)
ARTICHOKE IGE QN: 95 MG/DL (ref 0–130)
AST SERPL-CCNC: 30 U/L (ref 0–33)
BILIRUB SERPL-MCNC: 1 MG/DL (ref 0.3–1.2)
BUN BLD-MCNC: 26 MG/DL (ref 9–23)
BUN/CREAT SERPL: 21.3 (ref 7–25)
CALCIUM SPEC-SCNC: 9.6 MG/DL (ref 8.7–10.4)
CHLORIDE SERPL-SCNC: 110 MMOL/L (ref 99–109)
CHOLEST SERPL-MCNC: 157 MG/DL (ref 0–200)
CO2 SERPL-SCNC: 23 MMOL/L (ref 20–31)
CREAT BLD-MCNC: 1.22 MG/DL (ref 0.6–1.3)
GFR SERPL CREATININE-BSD FRML MDRD: 58 ML/MIN/1.73
GLOBULIN UR ELPH-MCNC: 2.5 GM/DL
GLUCOSE BLD-MCNC: 108 MG/DL (ref 70–100)
HBA1C MFR BLD: 5.8 % (ref 4.8–5.6)
HDLC SERPL-MCNC: 36 MG/DL (ref 40–60)
POTASSIUM BLD-SCNC: 5.7 MMOL/L (ref 3.5–5.5)
PROT SERPL-MCNC: 7.3 G/DL (ref 5.7–8.2)
SODIUM BLD-SCNC: 140 MMOL/L (ref 132–146)
TRIGL SERPL-MCNC: 132 MG/DL (ref 0–150)

## 2018-07-12 PROCEDURE — 99213 OFFICE O/P EST LOW 20 MIN: CPT | Performed by: INTERNAL MEDICINE

## 2018-07-12 PROCEDURE — 80053 COMPREHEN METABOLIC PANEL: CPT | Performed by: INTERNAL MEDICINE

## 2018-07-12 PROCEDURE — 83036 HEMOGLOBIN GLYCOSYLATED A1C: CPT | Performed by: INTERNAL MEDICINE

## 2018-07-12 PROCEDURE — 36415 COLL VENOUS BLD VENIPUNCTURE: CPT | Performed by: INTERNAL MEDICINE

## 2018-07-12 PROCEDURE — 80061 LIPID PANEL: CPT | Performed by: INTERNAL MEDICINE

## 2018-07-12 NOTE — PATIENT INSTRUCTIONS
1.  Continue usual medicines and supplements - as listed.    2.  Follow well-balanced diabetic diet - low in salt low in sugar.  Prevent diabetes.    3.  Maintain routine exercise program - every week.    4.  The nurse will call - with test results.    5.  Next checkup October November - with new physician.

## 2018-07-12 NOTE — PROGRESS NOTES
Subjective   Ramos Anders is a 78 y.o. male.     History of Present Illness     The patient has a 5 year history of prediabetes.  His brother  age 66 of diabetes mellitus with coronary artery disease.  He has been on a diabetic diet but has maintained a BMI approximately 29 over the last 6 years.  He has associated hyperlipidemia and atrial fibrillation.    The following portions of the patient's history were reviewed and updated as appropriate: allergies, current medications, past family history, past medical history, past social history, past surgical history and problem list.    Review of Systems   Constitutional: Negative for appetite change and fatigue.   Respiratory: Negative for cough and shortness of breath.    Cardiovascular: Negative for chest pain and palpitations.   Gastrointestinal: Negative for abdominal distention and nausea.   Neurological: Positive for dizziness. Negative for light-headedness.        Emergency room visit  for transient vertigo.  MRI of brain negative.       Objective   Blood pressure 128/74, pulse 64, temperature 96.6 °F (35.9 °C), temperature source Oral, resp. rate 16, weight 85.6 kg (188 lb 12.8 oz), SpO2 98 %.    Physical Exam   Constitutional: He is oriented to person, place, and time. He appears well-developed and well-nourished. No distress.   Cardiovascular: Normal rate, regular rhythm and normal heart sounds.    Pulmonary/Chest: Effort normal and breath sounds normal. He has no wheezes. He has no rales.   Neurological: He is alert and oriented to person, place, and time. He exhibits normal muscle tone. Coordination normal.   Psychiatric: He has a normal mood and affect. His behavior is normal. Judgment and thought content normal.   Nursing note and vitals reviewed.    Procedures  Assessment/Plan   Ramos was seen today for hyperglycemia.    Diagnoses and all orders for this visit:    Pure hypercholesterolemia  -     Comprehensive Metabolic Panel  -      Lipid Panel    Impaired glucose tolerance  -     Hemoglobin A1c    Generalized osteoarthritis    Thrombocytopenia (CMS/HCC)      The prediabetes is well controlled with a hemoglobin A1c less than 6%.  Remarkably he has dropped 7 pounds of weight this year through concerted low-calorie diabetic diet.    The patient has substantial risk of ASCVD particularly with his family history.  He should continue on Lipitor and aspirin for primary prevention.    The patient has moderate generalized osteoarthritis.  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  I've encouraged him to maintain adequate strength and joint flexibility.    Patient Instructions   1.  Continue usual medicines and supplements - as listed.    2.  Follow well-balanced diabetic diet - low in salt low in sugar.  Prevent diabetes.    3.  Maintain routine exercise program - every week.    4.  The nurse will call - with test results.    5.  Next checkup October November - with new physician.    6.  Hemoglobin A1c 5.8% and fasting glucose 108 indicate good control of prediabetes.    7.  Chemistry panel is otherwise acceptable.    8.  LDL cholesterol is acceptable at 95.    Electronically signed Sandoval Hunter M.D.7/15/2018 9:30 AM

## 2018-07-17 ENCOUNTER — TELEPHONE (OUTPATIENT)
Dept: INTERNAL MEDICINE | Facility: CLINIC | Age: 78
End: 2018-07-17

## 2018-07-17 NOTE — TELEPHONE ENCOUNTER
Called labs to pt. Per TGF:  HbA1c 5.8% & - good control of prediabetes.  Chemistry panel is otherwise acceptable.  LDL acceptable at 95.  No new orders. Pt verb understanding

## 2018-12-17 NOTE — TELEPHONE ENCOUNTER
Per TGF, MRI is a little premature.  Per TGF,  rec pt come Thur for cortisone injection for what is likely gluteal tendonitis. Per TGF, MRI rec after other options tried and pt wld be ready to proceed with surgery.  Pt verb understanding and compliance.  Transferred to  for scheduling.   <<-----Click here for Discharge Medication Review